# Patient Record
Sex: FEMALE | Race: WHITE | Employment: UNEMPLOYED | URBAN - METROPOLITAN AREA
[De-identification: names, ages, dates, MRNs, and addresses within clinical notes are randomized per-mention and may not be internally consistent; named-entity substitution may affect disease eponyms.]

---

## 2018-07-18 ENCOUNTER — HOSPITAL ENCOUNTER (EMERGENCY)
Age: 23
Discharge: HOME | End: 2018-07-18
Attending: EMERGENCY MEDICINE
Payer: COMMERCIAL

## 2018-07-18 VITALS
TEMPERATURE: 98 F | DIASTOLIC BLOOD PRESSURE: 74 MMHG | WEIGHT: 110 LBS | SYSTOLIC BLOOD PRESSURE: 108 MMHG | OXYGEN SATURATION: 98 % | HEIGHT: 61 IN | BODY MASS INDEX: 20.77 KG/M2 | RESPIRATION RATE: 16 BRPM | HEART RATE: 78 BPM

## 2018-07-18 DIAGNOSIS — R13.19 ESOPHAGEAL DYSPHAGIA: Primary | ICD-10-CM

## 2018-07-18 LAB — STREP GRP A PCR: NEGATIVE

## 2018-07-18 PROCEDURE — 87880 STREP A ASSAY W/OPTIC: CPT

## 2018-07-18 PROCEDURE — 99284 EMERGENCY DEPT VISIT MOD MDM: CPT

## 2018-07-18 NOTE — ED PROVIDER NOTES
HPI:  7/18/18,   Time: 10:37 AM         Silvina Renee is a 20 y.o. female presenting to the ED for difficulty swallowing and a foreign body sensation in throat after eating a hamburger, beginning 1d ago. The complaint has been persistent, moderate in severity, and worsened by swallowing. She states she has difficulties tolerating her saliva    ROS:   Pertinent positives and negatives are stated within HPI, all other systems reviewed and are negative.  --------------------------------------------- PAST HISTORY ---------------------------------------------  Past Medical History:  has a past medical history of Asthma and Drug abuse. Past Surgical History:  has a past surgical history that includes [place] tooth extraction. Social History:  reports that she has been smoking. She has been smoking about 0.50 packs per day. She has never used smokeless tobacco. She reports that she drinks alcohol. She reports that she uses drugs, including IV. Family History: family history is not on file. The patients home medications have been reviewed. Allergies: Patient has no known allergies. -------------------------------------------------- RESULTS -------------------------------------------------  All laboratory and radiology results have been personally reviewed by myself   LABS:  Results for orders placed or performed during the hospital encounter of 07/18/18   Strep Screen Group A Throat   Result Value Ref Range    Strep Grp A PCR Negative Negative       RADIOLOGY:  Interpreted by Radiologist.  No orders to display       ------------------------- NURSING NOTES AND VITALS REVIEWED ---------------------------   The nursing notes within the ED encounter and vital signs as below have been reviewed.    /74   Pulse 78   Temp 98 °F (36.7 °C) (Oral)   Resp 16   Ht 5' 1\" (1.549 m)   Wt 110 lb (49.9 kg)   LMP 07/01/2018   SpO2 98%   BMI 20.78 kg/m²   Oxygen Saturation Interpretation: Normal      ---------------------------------------------------PHYSICAL EXAM--------------------------------------      Constitutional/General: Alert and oriented x3, well appearing, non toxic in NAD  Head: NC/AT  Eyes: PERRL, EOMI  Mouth: Oropharynx clear, handling secretions, no trismus  Neck: Supple, full ROM, no meningeal signs  Pulmonary: Lungs clear to auscultation bilaterally, no wheezes, rales, or rhonchi. Not in respiratory distress  Cardiovascular:  Regular rate and rhythm, no murmurs, gallops, or rubs. 2+ distal pulses    Extremities: Moves all extremities x 4. Warm and well perfused  Skin: warm and dry without rash  Neurologic: GCS 15,  Psych: Normal Affect      ------------------------------ ED COURSE/MEDICAL DECISION MAKING----------------------  Medications - No data to display      Medical Decision Making:      The patient was able to tolerate a carbonated beverage in the ER after which she felt better. She was told to stay on a soft diet today and follow up with PCP or return if her symptoms worsened    Counseling: The emergency provider has spoken with the patient and spouse/SO and discussed todays results, in addition to providing specific details for the plan of care and counseling regarding the diagnosis and prognosis. Questions are answered at this time and they are agreeable with the plan.      --------------------------------- IMPRESSION AND DISPOSITION ---------------------------------    IMPRESSION  1.  Esophageal dysphagia        DISPOSITION  Disposition: Discharge to home  Patient condition is stable                  Giovanni Jean MD  07/18/18 6898

## 2021-07-09 ENCOUNTER — HOSPITAL ENCOUNTER (EMERGENCY)
Age: 26
Discharge: HOME | End: 2021-07-09
Payer: COMMERCIAL

## 2021-07-09 ENCOUNTER — HOSPITAL ENCOUNTER (EMERGENCY)
Age: 26
Discharge: LEFT BEFORE DISCHARGE | End: 2021-07-09
Payer: COMMERCIAL

## 2021-07-09 VITALS
RESPIRATION RATE: 16 BRPM | DIASTOLIC BLOOD PRESSURE: 82 MMHG | OXYGEN SATURATION: 98 % | SYSTOLIC BLOOD PRESSURE: 132 MMHG | TEMPERATURE: 98 F | HEART RATE: 88 BPM | WEIGHT: 106 LBS | HEIGHT: 61 IN | BODY MASS INDEX: 20.01 KG/M2

## 2021-07-09 VITALS — OXYGEN SATURATION: 95 % | TEMPERATURE: 98.5 F | HEART RATE: 85 BPM

## 2021-07-09 DIAGNOSIS — L23.7 POISON IVY DERMATITIS: Primary | ICD-10-CM

## 2021-07-09 PROCEDURE — 99283 EMERGENCY DEPT VISIT LOW MDM: CPT

## 2021-07-09 PROCEDURE — 6370000000 HC RX 637 (ALT 250 FOR IP): Performed by: PHYSICIAN ASSISTANT

## 2021-07-09 RX ORDER — PREDNISONE 20 MG/1
60 TABLET ORAL ONCE
Status: COMPLETED | OUTPATIENT
Start: 2021-07-09 | End: 2021-07-09

## 2021-07-09 RX ORDER — PREDNISONE 20 MG/1
TABLET ORAL
Qty: 18 TABLET | Refills: 0 | Status: SHIPPED | OUTPATIENT
Start: 2021-07-09 | End: 2021-07-19

## 2021-07-09 RX ADMIN — PREDNISONE 60 MG: 20 TABLET ORAL at 19:23

## 2021-07-09 NOTE — ED NOTES
Pt came up to nurse and stated that she needed to leave due to a family emergency and exited ED.       Fred Ramirez LPN  92/15/62 5968

## 2021-07-09 NOTE — ED PROVIDER NOTES
[facility]                                                                                                                                        Department of Emergency Medicine   ED  Provider Note  Admit Date/RoomTime: 7/9/2021  6:46 PM  ED Room: 02/02        HPI:  7/9/21,   Time: 7:02 PM EDT         Samra Oliveira is a 27 y.o. female presenting to the ED for body wide itchy rash, beginning few days ago. The complaint has been persistent, moderate in severity, and worsened by nothing. States she was in contact with poison ivy and now has itchy rash all over. Has been using some OTC meds without relief. Uncomfortable. No other complaints. Denies fever, chills or vomiting. No new medications or other new contacts. ROS:     Constitutional: Negative for fever and chills  HENT: Negative for ear pain, sore throat and sinus pressure  Eyes: Negative for pain, discharge and redness  Respiratory:  Negative for shortness of breath, cough and wheezing  Cardiovascular: Negative for CP, edema or palpitations  Gastrointestinal: Negative for nausea, vomiting, diarrhea and abdominal distention  Genitourinary: Negative for dysuria and frequency  Musculoskeletal: Negative for back pain and arthralgia  Skin: See HPi  Neurological: Negative for weakness and headaches  Hematological: Negative for adenopathy    All other systems reviewed and are negative      -------------------------------- PAST HISTORY ----------------------------------  Past Medical History:  has a past medical history of Asthma and Drug abuse. Past Surgical History:  has a past surgical history that includes [place] tooth extraction. Social History:  reports that she has been smoking. She has been smoking about 0.50 packs per day. She has never used smokeless tobacco. She reports current alcohol use. She reports current drug use. Drug: IV. Family History: family history is not on file.      The patients home medications have been reviewed. Allergies: Patient has no known allergies. --------------------------------- RESULTS ------------------------------------------  All laboratory and radiology results have been personally reviewed by myself   LABS:  No results found for this visit on 07/09/21. RADIOLOGY:  Interpreted by Radiologist.  No orders to display       ----------------- NURSING NOTES AND VITALS REVIEWED ---------------   The nursing notes within the ED encounter and vital signs as below have been reviewed. /82   Pulse 88   Temp 98 °F (36.7 °C) (Temporal)   Resp 16   Ht 5' 1\" (1.549 m)   Wt 106 lb (48.1 kg)   LMP 06/30/2021   SpO2 98%   BMI 20.03 kg/m²   Oxygen Saturation Interpretation: Normal      --------------------------------PHYSICAL EXAM------------------------------------      Constitutional/General: Alert and oriented x3, well appearing, non toxic in NAD  Head: NC/AT  Eyes: PERRL, EOMI  Mouth: Oropharynx clear, handling secretions, no trismus  Neck: Supple, full ROM, no meningeal signs  Pulmonary: Lungs clear to auscultation bilaterally, no wheezes, rales, or rhonchi. Not in respiratory distress  Cardiovascular:  Regular rate and rhythm, no murmurs, gallops, or rubs. 2+ distal pulses  Extremities: Moves all extremities x 4. Warm and well perfused  Skin:  Pt has diffuse inflammatory rash consistent with contact dermatitis  Neurologic: GCS 15,  Intact. No focal deficits  Psych: Normal Affect      ------------------------ ED COURSE/MEDICAL DECISION MAKING----------------------  Medications   predniSONE (DELTASONE) tablet 60 mg (has no administration in time range)         Medical Decision Making:    Contact dermatitis. Plan Prednisone here and for home. Topical OTC agents as needed if persistent complaints. Pt aware and agreeable to plan       Counseling:    The emergency provider has spoken with the patient and discussed todays results, in addition to providing specific details for the plan of care and counseling regarding the diagnosis and prognosis. Questions are answered at this time and they are agreeable with the plan.      ------------------------ IMPRESSION AND DISPOSITION -------------------------------    IMPRESSION  1.  Poison ivy dermatitis        DISPOSITION  Disposition: Discharge to home  Patient condition is stable                   Annabelle Flores PA-C  07/09/21 5708

## 2021-09-22 VITALS
TEMPERATURE: 97 F | SYSTOLIC BLOOD PRESSURE: 123 MMHG | HEIGHT: 61 IN | HEART RATE: 89 BPM | DIASTOLIC BLOOD PRESSURE: 79 MMHG | WEIGHT: 105 LBS | RESPIRATION RATE: 16 BRPM | BODY MASS INDEX: 19.83 KG/M2 | OXYGEN SATURATION: 97 %

## 2021-09-22 ASSESSMENT — PAIN SCALES - GENERAL: PAINLEVEL_OUTOF10: 6

## 2021-09-23 ENCOUNTER — HOSPITAL ENCOUNTER (EMERGENCY)
Age: 26
Discharge: HOME | End: 2021-09-23
Payer: COMMERCIAL

## 2021-11-01 ENCOUNTER — HOSPITAL ENCOUNTER (EMERGENCY)
Age: 26
Discharge: HOME | End: 2021-11-01
Payer: COMMERCIAL

## 2021-11-01 VITALS
WEIGHT: 104 LBS | HEIGHT: 61 IN | SYSTOLIC BLOOD PRESSURE: 121 MMHG | TEMPERATURE: 97.8 F | RESPIRATION RATE: 16 BRPM | BODY MASS INDEX: 19.63 KG/M2 | OXYGEN SATURATION: 98 % | HEART RATE: 70 BPM | DIASTOLIC BLOOD PRESSURE: 88 MMHG

## 2021-11-01 DIAGNOSIS — N89.8 VAGINAL DISCHARGE: Primary | ICD-10-CM

## 2021-11-01 LAB
BACTERIA: ABNORMAL /HPF
BILIRUBIN URINE: NEGATIVE
BLOOD, URINE: NEGATIVE
CLARITY: ABNORMAL
CLUE CELLS: NORMAL
COLOR: YELLOW
EPITHELIAL CELLS, UA: ABNORMAL /HPF
GLUCOSE URINE: 100 MG/DL
HCG, URINE, POC: NEGATIVE
KETONES, URINE: 15 MG/DL
LEUKOCYTE ESTERASE, URINE: NEGATIVE
Lab: NORMAL
NEGATIVE QC PASS/FAIL: NORMAL
NITRITE, URINE: NEGATIVE
PH UA: 6 (ref 5–9)
POSITIVE QC PASS/FAIL: NORMAL
PROTEIN UA: NEGATIVE MG/DL
RBC UA: ABNORMAL /HPF (ref 0–2)
SOURCE WET PREP: NORMAL
SPECIFIC GRAVITY UA: 1.02 (ref 1–1.03)
TRICHOMONAS PREP: NORMAL
UROBILINOGEN, URINE: 4 E.U./DL
WBC UA: ABNORMAL /HPF (ref 0–5)
YEAST WET PREP: NORMAL

## 2021-11-01 PROCEDURE — 87210 SMEAR WET MOUNT SALINE/INK: CPT

## 2021-11-01 PROCEDURE — 99283 EMERGENCY DEPT VISIT LOW MDM: CPT

## 2021-11-01 PROCEDURE — 87491 CHLMYD TRACH DNA AMP PROBE: CPT

## 2021-11-01 PROCEDURE — 87591 N.GONORRHOEAE DNA AMP PROB: CPT

## 2021-11-01 PROCEDURE — 81001 URINALYSIS AUTO W/SCOPE: CPT

## 2021-11-01 PROCEDURE — 96372 THER/PROPH/DIAG INJ SC/IM: CPT

## 2021-11-01 PROCEDURE — 6370000000 HC RX 637 (ALT 250 FOR IP): Performed by: NURSE PRACTITIONER

## 2021-11-01 PROCEDURE — 6360000002 HC RX W HCPCS: Performed by: NURSE PRACTITIONER

## 2021-11-01 RX ORDER — CEFTRIAXONE 1 G/1
500 INJECTION, POWDER, FOR SOLUTION INTRAMUSCULAR; INTRAVENOUS ONCE
Status: COMPLETED | OUTPATIENT
Start: 2021-11-01 | End: 2021-11-01

## 2021-11-01 RX ORDER — ONDANSETRON 4 MG/1
4 TABLET, ORALLY DISINTEGRATING ORAL ONCE
Status: COMPLETED | OUTPATIENT
Start: 2021-11-01 | End: 2021-11-01

## 2021-11-01 RX ORDER — METHADONE HYDROCHLORIDE 10 MG/ML
100 CONCENTRATE ORAL DAILY
COMMUNITY

## 2021-11-01 RX ORDER — AZITHROMYCIN 250 MG/1
1000 TABLET, FILM COATED ORAL ONCE
Status: COMPLETED | OUTPATIENT
Start: 2021-11-01 | End: 2021-11-01

## 2021-11-01 RX ADMIN — ONDANSETRON 4 MG: 4 TABLET, ORALLY DISINTEGRATING ORAL at 14:28

## 2021-11-01 RX ADMIN — CEFTRIAXONE 500 MG: 1 INJECTION, POWDER, FOR SOLUTION INTRAMUSCULAR; INTRAVENOUS at 14:28

## 2021-11-01 RX ADMIN — AZITHROMYCIN 1000 MG: 250 TABLET, FILM COATED ORAL at 14:28

## 2021-11-01 NOTE — ED PROVIDER NOTES
[address]  Department of Emergency Medicine   ED  Encounter Note  Admit Date/RoomTime: 2021 11:59 AM  ED Room: CHAIR01/    NAME: Catie Lester  : 1995  MRN: [id number]     Chief Complaint:  Vaginal Discharge (patient states vaginal discharge and odor and wants antibiotics)    History of Present Illness       Catie Lester is a 27 y.o. old female who presents to the emergency department by private vehicle for vaginal discharge: malodorous, which occured a few day(s) prior to arrival.  Since onset the symptoms have been stable and mild in severity. Symptoms are associated with no additional symptoms and denies any abdominal pain, fever, chills or vaginal bleeding. She takes no blood thinning agents. Patient's last menstrual period was 10/25/2021. No obstetric history on file. Does not have an OB/GYN and denies concern for STI. ROS   Pertinent positives and negatives are stated within HPI, all other systems reviewed and are negative. Past Medical History:  has a past medical history of Asthma and Drug abuse ([facility] [address] Surgical History:  has a past surgical history that includes [place] tooth extraction. Social History:  reports that she has been smoking. She has been smoking about 0.50 packs per day. She has never used smokeless tobacco. She reports current alcohol use. She reports current drug use. Drug: IV. Family History: family history is not on file. Allergies: Patient has no known allergies. Physical Exam   Oxygen Saturation Interpretation: Normal.        ED Triage Vitals [21 1052]   BP Temp Temp src Pulse Resp SpO2 Height Weight   117/82 97.8 °F (36.6 °C) -- 76 16 99 % 5' 1\" (1.549 m) 104 lb (47.2 kg)         General Appearance/Constitutional:  Alert, development consistent with age, NAD. HEENT:  NC/NT. PERRLA. Airway patent. Neck:  Supple. No lymphadenopathy.   Respiratory:  Clear to auscultation and breath sounds equal.  CV:  Regular rate and rhythm. GI:  normal appearing, non-distended with no visible hernias. Bowel sounds: normal bowel sounds. Tenderness: No abdominal tenderness, guarding, rebound, rigidity or pulsatile mass. .        Liver: non-tender. Spleen:  non-tender. Back: CVA Tenderness: No CVA tenderness. : Pelvic Exam: (Same sex / third party chaperone present during examination). External Genitalia: General appearance; normal, Hair distribution; normal, Lesions absent. Urethral Meatus: Size normal, Location normal, Lesions absent, Prolapse absent. Vagina: discharge. Cervix: cervical motion tenderness absent and friable. Uterus:  not examined. Adenexa: no tenderness bilaterally. Anus/Perineum:  normal.  Integument:  Normal turgor. Warm, dry, without visible rash, unless noted elsewhere. Lymphatics: No lymphangitis or adenopathy noted. Neurological:  Orientation age-appropriate. Motor functions intact.     Lab / Imaging Results   (All laboratory and radiology results have been personally reviewed by myself)  Labs:  Results for orders placed or performed during the hospital encounter of 11/01/21   Wet prep, genital    Specimen: Vaginal   Result Value Ref Range    Trichomonas Prep None Seen     Yeast, Wet Prep None Seen     Clue Cells, Wet Prep None Seen     Source Wet Prep VAGINAL    C.trachomatis N.gonorrhoeae DNA    Specimen: Cervix   Result Value Ref Range    Source Vagina    Urinalysis with Microscopic   Result Value Ref Range    Color, UA Yellow Straw/Yellow    Clarity, UA SL CLOUDY Clear    Glucose, Ur 100 (A) Negative mg/dL    Bilirubin Urine Negative Negative    Ketones, Urine 15 (A) Negative mg/dL    Specific Gravity, UA 1.025 1.005 - 1.030    Blood, Urine Negative Negative    pH, UA 6.0 5.0 - 9.0    Protein, UA Negative Negative mg/dL    Urobilinogen, Urine 4.0 (A) <2.0 E.U./dL    Nitrite, Urine Negative Negative    Leukocyte Esterase, Urine Negative Negative    WBC, UA NONE 0 - 5 /HPF    RBC, UA NONE 0 - 2 /HPF    Epithelial Cells, UA MODERATE /HPF    Bacteria, UA RARE (A) None Seen /HPF   POC Pregnancy Urine Qual   Result Value Ref Range    HCG, Urine, POC Negative Negative    Lot Number [id number]     Positive QC Pass/Fail Pass     Negative QC Pass/Fail Pass      Imaging: All Radiology results interpreted by Radiologist unless otherwise noted. No orders to display     ED Course / Medical Decision Making     Medications   cefTRIAXone (ROCEPHIN) injection 500 mg (has no administration in time range)   azithromycin (ZITHROMAX) tablet 1,000 mg (has no administration in time range)   ondansetron (ZOFRAN-ODT) disintegrating tablet 4 mg (has no administration in time range)          Consults:   None    Procedures:   none    MDM:   Patient is well-appearing, afebrile. Presents with vaginal discharge malodorous for several days. Denies concern for STI exposure. No abdominal pain fever or chills. UA trace ketones otherwise reassuring. POC pregnancy negative vaginal exam complete friable cervix with discharge noted on exam.  Wet prep obtained and negative. GC and Chlamydia cultures obtained and pending. Patient ordered STI prophylaxis, educated on safe sex practices and outpatient follow-up as needed. Plan of Care/Counseling:  CLAUDIA Kathleen CNP reviewed today's visit with the patient in addition to providing specific details for the plan of care and counseling regarding the diagnosis and prognosis. Questions are answered at this time and are agreeable with the plan. Assessment      1. Vaginal discharge      Plan   Discharged home. Patient condition is good    New Medications     New Prescriptions    No medications on file     Electronically signed by CLAUDIA Kathleen CNP   DD: 11/1/21  **This report was transcribed using voice recognition software.  Every effort was made to ensure accuracy; however, inadvertent computerized transcription errors may be present.   END OF ED PROVIDER NOTE        Geetha Johnston, CLAUDIA - EDITH  11/01/21 6974

## 2021-11-01 NOTE — ED NOTES
Patient was discharged but had not received her medication. She is unable to be found, patient eloped.       Nathalia Mcadams RN  11/01/21 1550

## 2021-11-04 LAB
C TRACH DNA GENITAL QL NAA+PROBE: NEGATIVE
N. GONORRHOEAE DNA: NEGATIVE
SOURCE: NORMAL

## 2022-02-11 ENCOUNTER — APPOINTMENT (OUTPATIENT)
Dept: GENERAL RADIOLOGY | Age: 27
DRG: 347 | End: 2022-02-11
Payer: COMMERCIAL

## 2022-02-11 ENCOUNTER — APPOINTMENT (OUTPATIENT)
Dept: CT IMAGING | Age: 27
DRG: 347 | End: 2022-02-11
Payer: COMMERCIAL

## 2022-02-11 ENCOUNTER — HOSPITAL ENCOUNTER (INPATIENT)
Age: 27
LOS: 3 days | Discharge: HOME HEALTH | DRG: 347 | End: 2022-02-14
Attending: STUDENT IN AN ORGANIZED HEALTH CARE EDUCATION/TRAINING PROGRAM | Admitting: SURGERY
Payer: COMMERCIAL

## 2022-02-11 DIAGNOSIS — S82.51XA CLOSED DISPLACED FRACTURE OF MEDIAL MALLEOLUS OF RIGHT TIBIA, INITIAL ENCOUNTER: ICD-10-CM

## 2022-02-11 DIAGNOSIS — S70.01XA CONTUSION OF RIGHT HIP, INITIAL ENCOUNTER: ICD-10-CM

## 2022-02-11 DIAGNOSIS — S82.891A CLOSED FRACTURE OF RIGHT ANKLE, INITIAL ENCOUNTER: ICD-10-CM

## 2022-02-11 DIAGNOSIS — S32.020A COMPRESSION FRACTURE OF L2 LUMBAR VERTEBRA, CLOSED, INITIAL ENCOUNTER (HCC): Primary | ICD-10-CM

## 2022-02-11 PROCEDURE — 99223 1ST HOSP IP/OBS HIGH 75: CPT | Performed by: SURGERY

## 2022-02-11 PROCEDURE — 73610 X-RAY EXAM OF ANKLE: CPT

## 2022-02-11 PROCEDURE — 1200000000 HC SEMI PRIVATE

## 2022-02-11 PROCEDURE — 72100 X-RAY EXAM L-S SPINE 2/3 VWS: CPT

## 2022-02-11 PROCEDURE — 99285 EMERGENCY DEPT VISIT HI MDM: CPT

## 2022-02-11 PROCEDURE — 72125 CT NECK SPINE W/O DYE: CPT

## 2022-02-11 PROCEDURE — 2580000003 HC RX 258: Performed by: STUDENT IN AN ORGANIZED HEALTH CARE EDUCATION/TRAINING PROGRAM

## 2022-02-11 PROCEDURE — 70450 CT HEAD/BRAIN W/O DYE: CPT

## 2022-02-11 PROCEDURE — 2W3QX1Z IMMOBILIZATION OF RIGHT LOWER LEG USING SPLINT: ICD-10-PCS | Performed by: STUDENT IN AN ORGANIZED HEALTH CARE EDUCATION/TRAINING PROGRAM

## 2022-02-11 PROCEDURE — 6360000002 HC RX W HCPCS

## 2022-02-11 PROCEDURE — 96376 TX/PRO/DX INJ SAME DRUG ADON: CPT

## 2022-02-11 PROCEDURE — 99222 1ST HOSP IP/OBS MODERATE 55: CPT | Performed by: NEUROLOGICAL SURGERY

## 2022-02-11 PROCEDURE — 72131 CT LUMBAR SPINE W/O DYE: CPT

## 2022-02-11 PROCEDURE — 6360000002 HC RX W HCPCS: Performed by: STUDENT IN AN ORGANIZED HEALTH CARE EDUCATION/TRAINING PROGRAM

## 2022-02-11 PROCEDURE — 6370000000 HC RX 637 (ALT 250 FOR IP): Performed by: STUDENT IN AN ORGANIZED HEALTH CARE EDUCATION/TRAINING PROGRAM

## 2022-02-11 PROCEDURE — 96374 THER/PROPH/DIAG INJ IV PUSH: CPT

## 2022-02-11 PROCEDURE — 73502 X-RAY EXAM HIP UNI 2-3 VIEWS: CPT

## 2022-02-11 PROCEDURE — 73630 X-RAY EXAM OF FOOT: CPT

## 2022-02-11 RX ORDER — OXYCODONE HYDROCHLORIDE AND ACETAMINOPHEN 5; 325 MG/1; MG/1
2 TABLET ORAL ONCE
Status: COMPLETED | OUTPATIENT
Start: 2022-02-11 | End: 2022-02-11

## 2022-02-11 RX ORDER — OXYCODONE HYDROCHLORIDE 10 MG/1
10 TABLET ORAL EVERY 4 HOURS PRN
Status: DISCONTINUED | OUTPATIENT
Start: 2022-02-11 | End: 2022-02-14 | Stop reason: HOSPADM

## 2022-02-11 RX ORDER — POLYETHYLENE GLYCOL 3350 17 G/17G
17 POWDER, FOR SOLUTION ORAL DAILY
Status: DISCONTINUED | OUTPATIENT
Start: 2022-02-12 | End: 2022-02-14 | Stop reason: HOSPADM

## 2022-02-11 RX ORDER — METHOCARBAMOL 500 MG/1
1000 TABLET, FILM COATED ORAL 4 TIMES DAILY
Status: DISCONTINUED | OUTPATIENT
Start: 2022-02-11 | End: 2022-02-14 | Stop reason: HOSPADM

## 2022-02-11 RX ORDER — FENTANYL CITRATE 50 UG/ML
INJECTION, SOLUTION INTRAMUSCULAR; INTRAVENOUS
Status: COMPLETED
Start: 2022-02-11 | End: 2022-02-11

## 2022-02-11 RX ORDER — FENTANYL CITRATE 50 UG/ML
50 INJECTION, SOLUTION INTRAMUSCULAR; INTRAVENOUS ONCE
Status: COMPLETED | OUTPATIENT
Start: 2022-02-11 | End: 2022-02-11

## 2022-02-11 RX ORDER — FENTANYL CITRATE 50 UG/ML
25 INJECTION, SOLUTION INTRAMUSCULAR; INTRAVENOUS ONCE
Status: COMPLETED | OUTPATIENT
Start: 2022-02-11 | End: 2022-02-11

## 2022-02-11 RX ORDER — LIDOCAINE HYDROCHLORIDE 10 MG/ML
20 INJECTION, SOLUTION INFILTRATION; PERINEURAL ONCE
Status: DISCONTINUED | OUTPATIENT
Start: 2022-02-11 | End: 2022-02-14 | Stop reason: HOSPADM

## 2022-02-11 RX ORDER — ONDANSETRON 2 MG/ML
4 INJECTION INTRAMUSCULAR; INTRAVENOUS EVERY 6 HOURS PRN
Status: DISCONTINUED | OUTPATIENT
Start: 2022-02-11 | End: 2022-02-14 | Stop reason: HOSPADM

## 2022-02-11 RX ORDER — SODIUM CHLORIDE 0.9 % (FLUSH) 0.9 %
5-40 SYRINGE (ML) INJECTION EVERY 12 HOURS SCHEDULED
Status: DISCONTINUED | OUTPATIENT
Start: 2022-02-11 | End: 2022-02-14 | Stop reason: HOSPADM

## 2022-02-11 RX ORDER — SODIUM CHLORIDE 0.9 % (FLUSH) 0.9 %
5-40 SYRINGE (ML) INJECTION PRN
Status: DISCONTINUED | OUTPATIENT
Start: 2022-02-11 | End: 2022-02-14 | Stop reason: HOSPADM

## 2022-02-11 RX ORDER — SODIUM CHLORIDE 9 MG/ML
25 INJECTION, SOLUTION INTRAVENOUS PRN
Status: DISCONTINUED | OUTPATIENT
Start: 2022-02-11 | End: 2022-02-14 | Stop reason: HOSPADM

## 2022-02-11 RX ORDER — LIDOCAINE HYDROCHLORIDE 10 MG/ML
INJECTION, SOLUTION INFILTRATION; PERINEURAL
Status: DISPENSED
Start: 2022-02-11 | End: 2022-02-12

## 2022-02-11 RX ORDER — ACETAMINOPHEN 325 MG/1
650 TABLET ORAL EVERY 6 HOURS
Status: DISCONTINUED | OUTPATIENT
Start: 2022-02-12 | End: 2022-02-14 | Stop reason: HOSPADM

## 2022-02-11 RX ORDER — GABAPENTIN 100 MG/1
100 CAPSULE ORAL EVERY 8 HOURS SCHEDULED
Status: DISCONTINUED | OUTPATIENT
Start: 2022-02-11 | End: 2022-02-14 | Stop reason: HOSPADM

## 2022-02-11 RX ORDER — OXYCODONE HYDROCHLORIDE 5 MG/1
5 TABLET ORAL EVERY 4 HOURS PRN
Status: DISCONTINUED | OUTPATIENT
Start: 2022-02-11 | End: 2022-02-14 | Stop reason: HOSPADM

## 2022-02-11 RX ORDER — ONDANSETRON 4 MG/1
4 TABLET, ORALLY DISINTEGRATING ORAL EVERY 8 HOURS PRN
Status: DISCONTINUED | OUTPATIENT
Start: 2022-02-11 | End: 2022-02-14 | Stop reason: HOSPADM

## 2022-02-11 RX ADMIN — FENTANYL CITRATE 50 MCG: 50 INJECTION, SOLUTION INTRAMUSCULAR; INTRAVENOUS at 21:09

## 2022-02-11 RX ADMIN — FENTANYL CITRATE 50 MCG: 0.05 INJECTION, SOLUTION INTRAMUSCULAR; INTRAVENOUS at 21:09

## 2022-02-11 RX ADMIN — FENTANYL CITRATE 25 MCG: 50 INJECTION, SOLUTION INTRAMUSCULAR; INTRAVENOUS at 15:12

## 2022-02-11 RX ADMIN — FENTANYL CITRATE 25 MCG: 50 INJECTION, SOLUTION INTRAMUSCULAR; INTRAVENOUS at 23:04

## 2022-02-11 RX ADMIN — OXYCODONE AND ACETAMINOPHEN 2 TABLET: 5; 325 TABLET ORAL at 18:07

## 2022-02-11 RX ADMIN — FENTANYL CITRATE 25 MCG: 50 INJECTION, SOLUTION INTRAMUSCULAR; INTRAVENOUS at 13:01

## 2022-02-11 RX ADMIN — Medication 10 ML: at 23:04

## 2022-02-11 ASSESSMENT — PAIN SCALES - GENERAL
PAINLEVEL_OUTOF10: 10
PAINLEVEL_OUTOF10: 8
PAINLEVEL_OUTOF10: 9

## 2022-02-11 ASSESSMENT — PAIN DESCRIPTION - DESCRIPTORS: DESCRIPTORS: ACHING;JABBING;NAGGING

## 2022-02-11 ASSESSMENT — PAIN DESCRIPTION - ORIENTATION: ORIENTATION: RIGHT

## 2022-02-11 ASSESSMENT — PAIN DESCRIPTION - LOCATION: LOCATION: ANKLE;BACK;HIP

## 2022-02-11 ASSESSMENT — PAIN DESCRIPTION - PAIN TYPE: TYPE: ACUTE PAIN

## 2022-02-11 NOTE — ED PROVIDER NOTES
ED  Provider Note  Admit Date/RoomTime: 2/11/2022 12:27 PM  ED Room: [address] [place] [address]      History of Present Illness:  2/11/22, Time: 12:31 PM EST  Chief Complaint   Patient presents with    Fall     pt states she fell from 2nd story window while cleaning today. -LOC, landed on ankle and bottom. c/o right sided pain to hip, back, and ankle. denies CP, SOB, headache       Merari Gonzales is a 27 y.o. female presenting to the ED for right ankle and right hip pain after mechanical fall. Patient was cleaning her window (first floor), and was kneeling on the windowsill on the inside of the window when she fell forward, onto her right hip and ankle, denies any head strike or loss consciousness. No focal weakness or numbness. This event occurred 2 days ago, but patient states she was unable to come to the emergency department as she was unable to walk. Denies head strike or loss consciousness. No thinners. Denies chest pain, shortness of breath, abdominal pain, nausea or vomiting. No weakness or numbness of lower extremities, no saddle anaesthesia, no bowel or bladder incontinence, no urinary retention. Onset: Sudden  Timing: Persistent  Duration: Days  Associated symptoms: As above, no fevers or chills, no cough/congestion/rhinorrhea. Severity: Severe  Exacerbated by: Movement  Relieved by: Minimal relief with home Tylenol. Review of Systems:   A complete review of systems was performed and pertinent positives and negatives are stated within HPI, all other systems reviewed and are negative.        --------------------------------------------- PATIENT HISTORY--------------------------------------------  Past Medical History:  has a past medical history of Asthma and Drug abuse ([facility] [address] Past Surgical History:  has a past surgical history that includes [place] tooth extraction. Family History: family history is not on file. . Unless otherwise noted, family history is non contributory    Social History:  reports that she has been smoking cigarettes. She has been smoking about 1.00 pack per day. She has never used smokeless tobacco. She reports current alcohol use. She reports current drug use. Drug: IV. The patients home medications have been reviewed. Allergies: Patient has no known allergies. I have reviewed the past medical history, past surgical history, social history, and family history    ---------------------------------------------------PHYSICAL EXAM--------------------------------------    Constitutional/General: Alert and oriented x3  Head: Normocephalic and atraumatic  Eyes: PERRL, EOMI, sclera non icteric  ENT: Oropharynx clear, handling secretions, no trismus  Neck: Supple, full ROM, no stridor, no meningismus  Respiratory: Lungs clear to auscultation bilaterally  Cardiovascular: RRR, no R/G/M, 2+ peripheral pulses  Chest: No chest wall tenderness, equal chest rise  Gastrointestinal: Soft nontender nondistended  Musculoskeletal: Extremities warm and well perfused, moving all extremities, mild edema to R ankle TTP, ecchymosis of ankle diffusely, pain limited ROM at R hip, knee d/t ankle pain, no midline spinal TTP, no stepoffs or deformities  Skin: skin warm and dry. No rashes. Neurologic: No focal deficits, strength and sensation grossly intact   Psychiatric: Normal Affect, behavior normal      -------------------------------------------------- RESULTS -------------------------------------------------  I have personally reviewed all laboratory and imaging results for this patient. Results are listed below. LABS: (Lab results interpreted by me)  No results found for this visit on 02/11/22. RADIOLOGY:  Imaging interpreted by Radiologist unless otherwise specified  CT LUMBAR SPINE WO CONTRAST   Final Result   Moderate superior endplate compression fracture L2 vertebral body with 3 mm   of retropulsion. XR HIP 2-3 VW W PELVIS RIGHT   Final Result   No acute abnormality of the hip. PROCEDURE NOTE  2/11/22         SPLINT  APPLICATION  Risks, benefits and alternatives (for applicable procedures below) described. Performed By: Rhonda Aguilar MD and EM Resident. Indication:  fracture of ankle . Procedure:   A right leg  stirrup splint was applied by me. The patient tolerated the procedure well. Trauma consult, pain control. CSP and CT head ordered and pending. ED Counseling: This emergency provider has spoken with the patient and any family present to discuss clinical status, results, plan of care, diagnosis and prognosis as able to be determined at this time. Any questions were answered and patient and/or family/POA are agreeable with the plan.       --------------------------------- IMPRESSION AND DISPOSITION ---------------------------------    IMPRESSION  1. Compression fracture of L2 lumbar vertebra, closed, initial encounter ([facility] [address]    2. Closed fracture of right ankle, initial encounter        DISPOSITION  Disposition: Admit to med/surg floor  Patient condition is stable      This report was transcribed using voice recognition software. Every effort was made to ensure accuracy; however, transcription errors may be present.          Rhonda Aguilar MD  02/11/22 2008

## 2022-02-11 NOTE — ED NOTES
Patient arrives via EMS from a soup kitchen. Patient states yesterday she was cleanig windows when she fell 2 stories. Patient c/o right ankle and lower back pain. Patient has full sensation in right foot and ankle and + pulses. Pt undressed in hospital gown, bed in low position, side rails up. MD at bedside.      [facility]  02/11/22 [address] [facility], RN  02/11/22 1230

## 2022-02-11 NOTE — ED NOTES
Radiology Procedure Waiver   Name: Thais Santiago  : 1995  MRN: [id number]    Date:  22    Time: 4:26 PM EST    Benefits of immediately proceeding with Radiology exam(s) without pre-testing outweigh the risks or are not indicated as specified below and therefore the following is/are being waived:    [] Pregnancy test   [x] Patients LMP on-time and regular.   [] Patient had Tubal Ligation or has other Contraception Device. [] Patient  is Menopausal or Premenarcheal.    [] Patient had Full or Partial Hysterectomy. [] Protocol for Iodine allergy    [] MRI Questionnaire     [] BUN/Creatinine   [] Patient age w/no hx of renal dysfunction. [] Patient on Dialysis. [] Recent Normal Labs.   Electronically signed by Rodney Braden MD on 22 at 4:26 PM CARLOS Braden MD  22 [phone]

## 2022-02-12 PROBLEM — F11.20 METHADONE MAINTENANCE THERAPY PATIENT (HCC): Status: ACTIVE | Noted: 2022-02-12

## 2022-02-12 PROBLEM — F14.10 COCAINE ABUSE (HCC): Status: ACTIVE | Noted: 2022-02-12

## 2022-02-12 PROBLEM — S82.51XA CLOSED DISPLACED FRACTURE OF MEDIAL MALLEOLUS OF RIGHT TIBIA: Status: ACTIVE | Noted: 2022-02-12

## 2022-02-12 LAB
AMPHETAMINE SCREEN, URINE: NOT DETECTED
ANION GAP SERPL CALCULATED.3IONS-SCNC: 10 MMOL/L (ref 7–16)
BARBITURATE SCREEN URINE: NOT DETECTED
BASOPHILS ABSOLUTE: 0.05 E9/L (ref 0–0.2)
BASOPHILS RELATIVE PERCENT: 0.5 % (ref 0–2)
BENZODIAZEPINE SCREEN, URINE: NOT DETECTED
BUN BLDV-MCNC: 12 MG/DL (ref 6–20)
CALCIUM SERPL-MCNC: 9.4 MG/DL (ref 8.6–10.2)
CANNABINOID SCREEN URINE: NOT DETECTED
CHLORIDE BLD-SCNC: 101 MMOL/L (ref 98–107)
CO2: 24 MMOL/L (ref 22–29)
COCAINE METABOLITE SCREEN URINE: POSITIVE
CREAT SERPL-MCNC: 0.7 MG/DL (ref 0.5–1)
EOSINOPHILS ABSOLUTE: 0.06 E9/L (ref 0.05–0.5)
EOSINOPHILS RELATIVE PERCENT: 0.6 % (ref 0–6)
FENTANYL SCREEN, URINE: POSITIVE
GFR AFRICAN AMERICAN: >60
GFR NON-AFRICAN AMERICAN: >60 ML/MIN/1.73
GLUCOSE BLD-MCNC: 109 MG/DL (ref 74–99)
HCG(URINE) PREGNANCY TEST: NEGATIVE
HCT VFR BLD CALC: 38.8 % (ref 34–48)
HEMOGLOBIN: 12.8 G/DL (ref 11.5–15.5)
IMMATURE GRANULOCYTES #: 0.05 E9/L
IMMATURE GRANULOCYTES %: 0.5 % (ref 0–5)
LYMPHOCYTES ABSOLUTE: 1.55 E9/L (ref 1.5–4)
LYMPHOCYTES RELATIVE PERCENT: 14.8 % (ref 20–42)
Lab: ABNORMAL
MCH RBC QN AUTO: 30.7 PG (ref 26–35)
MCHC RBC AUTO-ENTMCNC: 33 % (ref 32–34.5)
MCV RBC AUTO: 93 FL (ref 80–99.9)
METHADONE SCREEN, URINE: POSITIVE
MONOCYTES ABSOLUTE: 1 E9/L (ref 0.1–0.95)
MONOCYTES RELATIVE PERCENT: 9.6 % (ref 2–12)
NEUTROPHILS ABSOLUTE: 7.73 E9/L (ref 1.8–7.3)
NEUTROPHILS RELATIVE PERCENT: 74 % (ref 43–80)
OPIATE SCREEN URINE: NOT DETECTED
OXYCODONE URINE: POSITIVE
PDW BLD-RTO: 11.2 FL (ref 11.5–15)
PHENCYCLIDINE SCREEN URINE: NOT DETECTED
PLATELET # BLD: 313 E9/L (ref 130–450)
PMV BLD AUTO: 10.1 FL (ref 7–12)
POTASSIUM REFLEX MAGNESIUM: 4.7 MMOL/L (ref 3.5–5)
RBC # BLD: 4.17 E12/L (ref 3.5–5.5)
SODIUM BLD-SCNC: 135 MMOL/L (ref 132–146)
WBC # BLD: 10.4 E9/L (ref 4.5–11.5)

## 2022-02-12 PROCEDURE — 36415 COLL VENOUS BLD VENIPUNCTURE: CPT

## 2022-02-12 PROCEDURE — 85025 COMPLETE CBC W/AUTO DIFF WBC: CPT

## 2022-02-12 PROCEDURE — 1200000000 HC SEMI PRIVATE

## 2022-02-12 PROCEDURE — 6370000000 HC RX 637 (ALT 250 FOR IP): Performed by: STUDENT IN AN ORGANIZED HEALTH CARE EDUCATION/TRAINING PROGRAM

## 2022-02-12 PROCEDURE — 6360000002 HC RX W HCPCS: Performed by: STUDENT IN AN ORGANIZED HEALTH CARE EDUCATION/TRAINING PROGRAM

## 2022-02-12 PROCEDURE — 80307 DRUG TEST PRSMV CHEM ANLYZR: CPT

## 2022-02-12 PROCEDURE — 99232 SBSQ HOSP IP/OBS MODERATE 35: CPT | Performed by: NEUROLOGICAL SURGERY

## 2022-02-12 PROCEDURE — 2580000003 HC RX 258: Performed by: STUDENT IN AN ORGANIZED HEALTH CARE EDUCATION/TRAINING PROGRAM

## 2022-02-12 PROCEDURE — 80048 BASIC METABOLIC PNL TOTAL CA: CPT

## 2022-02-12 PROCEDURE — 81025 URINE PREGNANCY TEST: CPT

## 2022-02-12 RX ORDER — METHADONE HYDROCHLORIDE 10 MG/ML
100 CONCENTRATE ORAL DAILY
Status: DISCONTINUED | OUTPATIENT
Start: 2022-02-12 | End: 2022-02-14 | Stop reason: HOSPADM

## 2022-02-12 RX ADMIN — METHOCARBAMOL 1000 MG: 500 TABLET ORAL at 17:29

## 2022-02-12 RX ADMIN — GABAPENTIN 100 MG: 100 CAPSULE ORAL at 20:51

## 2022-02-12 RX ADMIN — Medication 5 ML: at 20:52

## 2022-02-12 RX ADMIN — POLYETHYLENE GLYCOL 3350 17 G: 17 POWDER, FOR SOLUTION ORAL at 08:23

## 2022-02-12 RX ADMIN — OXYCODONE HYDROCHLORIDE 10 MG: 10 TABLET ORAL at 01:57

## 2022-02-12 RX ADMIN — GABAPENTIN 100 MG: 100 CAPSULE ORAL at 01:36

## 2022-02-12 RX ADMIN — METHOCARBAMOL 1000 MG: 500 TABLET ORAL at 20:51

## 2022-02-12 RX ADMIN — ENOXAPARIN SODIUM 30 MG: 100 INJECTION SUBCUTANEOUS at 08:23

## 2022-02-12 RX ADMIN — Medication 10 ML: at 08:27

## 2022-02-12 RX ADMIN — METHOCARBAMOL 1000 MG: 500 TABLET ORAL at 12:34

## 2022-02-12 RX ADMIN — ACETAMINOPHEN 650 MG: 325 TABLET ORAL at 12:34

## 2022-02-12 RX ADMIN — ACETAMINOPHEN 650 MG: 325 TABLET ORAL at 18:25

## 2022-02-12 RX ADMIN — METHOCARBAMOL 1000 MG: 500 TABLET ORAL at 01:36

## 2022-02-12 RX ADMIN — OXYCODONE HYDROCHLORIDE 10 MG: 10 TABLET ORAL at 21:48

## 2022-02-12 RX ADMIN — OXYCODONE HYDROCHLORIDE 10 MG: 10 TABLET ORAL at 17:29

## 2022-02-12 RX ADMIN — OXYCODONE HYDROCHLORIDE 10 MG: 10 TABLET ORAL at 12:57

## 2022-02-12 RX ADMIN — ACETAMINOPHEN 650 MG: 325 TABLET ORAL at 01:36

## 2022-02-12 RX ADMIN — GABAPENTIN 100 MG: 100 CAPSULE ORAL at 13:18

## 2022-02-12 RX ADMIN — OXYCODONE HYDROCHLORIDE 10 MG: 10 TABLET ORAL at 08:23

## 2022-02-12 RX ADMIN — METHOCARBAMOL 1000 MG: 500 TABLET ORAL at 08:23

## 2022-02-12 RX ADMIN — Medication 100 MG: at 08:35

## 2022-02-12 ASSESSMENT — PAIN DESCRIPTION - ORIENTATION: ORIENTATION: RIGHT

## 2022-02-12 ASSESSMENT — PAIN SCALES - GENERAL
PAINLEVEL_OUTOF10: 9
PAINLEVEL_OUTOF10: 8
PAINLEVEL_OUTOF10: 10
PAINLEVEL_OUTOF10: 6
PAINLEVEL_OUTOF10: 9
PAINLEVEL_OUTOF10: 10
PAINLEVEL_OUTOF10: 8
PAINLEVEL_OUTOF10: 10
PAINLEVEL_OUTOF10: 9

## 2022-02-12 ASSESSMENT — PAIN DESCRIPTION - DESCRIPTORS
DESCRIPTORS: ACHING;JABBING
DESCRIPTORS: ACHING;DISCOMFORT
DESCRIPTORS: ACHING;JABBING

## 2022-02-12 ASSESSMENT — PAIN DESCRIPTION - LOCATION
LOCATION: BACK

## 2022-02-12 ASSESSMENT — PAIN DESCRIPTION - ONSET: ONSET: ON-GOING

## 2022-02-12 ASSESSMENT — PAIN DESCRIPTION - FREQUENCY: FREQUENCY: CONTINUOUS

## 2022-02-12 ASSESSMENT — PAIN DESCRIPTION - PAIN TYPE
TYPE: ACUTE PAIN

## 2022-02-12 NOTE — PROGRESS NOTES
OCCUPATIONAL THERAPY TREATMENT NOTE     [facility]      OT BEDSIDE TREATMENT NOTE      Date:2022  Patient Name: Hazel Church  MRN: [id number]  : 1995  Room: [address] [place] [address]A       OT orders received & appreciated, chart reviewed, currently awaiting TLSO. Will follow in accordance with NS & Ortho orders/protocol/POC. Thank you,  Claudio Shin, OTR/L   License #  [id number]

## 2022-02-12 NOTE — H&P
TRAUMA HISTORY & PHYSICAL  Surgical Resident/Advance Practice Nurse  2/11/2022  8:06 PM    PRIMARY SURVEY    CHIEF COMPLAINT:  Trauma consult. Injury occurred 2 days prior to arrival. Patient was cleaning a two story window when the window closed and caused her to fall forward. She was unable to come to the ED before because she was unable to get a ride. She landed on her lower back and buttock. No head injury or LOC. Not on blood thinner. Endorses right hip, right ankle and lumbar spine pain. Denies headache, changes in vision, nausea, vomiting, chest pain, abdominal pain, decrease sensation, numbness or tingling, bowel or bladder dysfunction. CT lumbar spine showed a moderate L2 superior endplate compression fx with retropulsion. Xray of right ankle shows malleolar fx. CT head and C-spine pending. AIRWAY:   Airway Normal  EMS ETT Absent  Noisy respirations Absent  Retractions: Absent  Vomiting/bleeding: Absent      BREATHING:    Midaxillary breath sound left:  Normal  Midaxillary breath sound right:  Normal    Cough sound intensity:  good   FiO2:  Room air    SMI 2000 mL.       CIRCULATION:   Femerol pulse intensity: Strong  Palpebral conjunctiva: Pink     Vitals:    02/11/22 1647   BP: 113/61   Pulse: 67   Resp: 18   Temp:    SpO2: 96%       Vitals:    02/11/22 1229 02/11/22 1450 02/11/22 1647   BP: 116/62 101/64 113/61   Pulse: 78 72 67   Resp: 17 18 18   Temp: 97.9 °F (36.6 °C)     TempSrc: Oral     SpO2: 99% 98% 96%   Weight: 105 lb (47.6 kg)     Height: 5' 1\" (1.549 m)          FAST EXAM: Deferred    Central Nervous System    GCS Initial 15 minutes   Eye  Motor  Verbal 4 - Opens eyes on own  6 - Follows simple motor commands  5 - Alert and oriented 4 - Opens eyes on own  6 - Follows simple motor commands  5 - Alert and oriented     Neuromuscular blockade: No  Pupil size:  Left 3 mm    Right 3 mm  Pupil reaction: Yes    Wiggles fingers: Left Yes Right Yes  Wiggles toes: Left Yes   Right Yes    Hand grasp: Left  Present      Right  Present  Plantar flexion: Left  Present      Right   Limited 2/2 pain    Loss of consciousness:  No    History Obtained From:  Patient & EMS  Private Medical Doctor:     Pre-exisiting Medical History:  yes    Conditions: asthma    Medications: methadone    Allergies: NKDA    Social History:   Tobacco use:  positive for approximately 1/2 packs per day. Patient advised to quit smoking  Alcohol use:  none  Illicit drug use:  methadone    Past Surgical History:  none    Anticoagulant use: None  Antiplatelet use:   None    NSAID use in last 72 hours: yes  Taken PCN in past:  unknown  Last food/drink: yesterday evening  Last tetanus: unknown    Family History:   No family history of anesthesia complications    Complaints:   Head:  None  Neck:   None  Chest:   None  Back:   Moderate lumbar midline and paraspinal pain  Abdomen:   None  Extremities:   Moderate  Comments:     Review of systems:  All negative unless otherwise noted. SECONDARY SURVEY  Head/scalp: Atraumatic    Face: Atraumatic    Eyes/ears/nose: Atraumatic    Pharynx/mouth: Atraumatic    Neck: Atraumatic     Cervical spine tenderness:   Cervical collar not indicated  Pain:  none  ROM:  Normal    Chest wall:  Atraumatic    Heart:  Regular rate & rhythm    Abdomen: Atraumatic. Soft ND  Tenderness:  none    Pelvis: Atraumatic  Tenderness: none    Thoracolumbar spine: Atraumatic  Tenderness:  Midline and paraspinal lumbar    Genitourinary:  Atraumatic. No blood or urine noted    Rectum: Atraumatic. No blood noted. Perineum: Atraumatic. No blood or urine noted.       Extremities:   Sensory normal  Motor normal  Bruising on bilateral thighs and left arm    Distal Pulses  Left arm normal  Right arm normal  Left leg normal  Right leg normal    Capillary refill  Left arm normal  Right arm normal  Left leg normal  Right leg normal    Procedures in ED:  none    In the event of Emergency Blood Transfusion:  Due to the critical condition of this patient, I request the immediate release of blood products for emergency transfusion secondary to shock. I understand the increased risks incurred by the lack of complete transfusion testing. Radiology: Xray lumbar spine, R ankle, R foot, R hip.  CT C+T spine, CT head    Consultations:  Orthopedic surgery and Neurosurgery    Admission/Diagnosis: L2 superior end plate compression fracture and R malleolar fx    Plan of Treatment:  Tert  Pain control  Neurosurgery recs  Ortho recs  Maintain spine neutrality  F/u CT C spine and head  F/u labs    Plan discussed with Dr. Honey Waterman    at 2/11/2022 on 8:06 PM    Electronically signed by Georgette Rebollar MD on 2/11/2022 at 8:06 PM

## 2022-02-12 NOTE — CONSULTS
[address]                  [address] [facility], [address]                                  CONSULTATION    PATIENT NAME: Abigail Cervantes                  :        1995  MED REC NO:   [id number]                            ROOM:       14B  ACCOUNT NO:   [de-identified]                           ADMIT DATE: 2022  PROVIDER:     Dexter Goyal MD    CONSULT DATE:  2022    REASON FOR CONSULT:  L2 compression fracture. HISTORY OF PRESENT ILLNESS:  The patient is a 20-year-old lady who fell  through a window approximately two days ago. Immediately after the  fall, she was complaining of excruciating back pain and also right lower  extremity pain and she was ultimately brought to [facility] today for further evaluation and management. Part of her  workup included a CT scan of her lumbar spine that showed an acute L2  compression fracture. As a result of that, neurosurgery service was  consulted. PAST MEDICAL HISTORY:  Positive for asthma and polysubstance abuse. PAST SURGICAL HISTORY:  Positive for wisdom teeth extraction. FAMILY HISTORY:  Positive for emphysema in her mother. SOCIAL HISTORY:  Positive for tobacco use. ALLERGIES:  She has no known drug allergies. HOME MEDICATIONS:  Include methadone. REVIEW OF SYSTEMS:  HEENT:  Negative for headache, double vision, or  blurry vision. CARDIOVASCULAR:  Negative for chest pain, arrhythmia, or  palpitations. RESPIRATORY:  Negative for shortness of breath, asthma,  bronchitis, or pneumonia. GASTROINTESTINAL:  Negative for heartburn,  nausea, vomiting, diarrhea, or constipation. GENITOURINARY:  Negative  for hematuria or dysuria. HEMATOLOGIC:  Positive for easy bruising. INFECTIOUS:  Negative for any recent infection. MUSCULOSKELETAL:   Positive for back pain. PSYCHIATRIC:  Negative for anxiety or  depression. NEUROLOGIC:  Negative for seizure or stroke. ENDOCRINE:   Negative for thyroid disorder or diabetes. PHYSICAL EXAMINATION:  VITAL SIGNS:  She is currently afebrile with a T-current of 36.6 degrees  Celsius, pulse 78, blood pressure is 116/62, respiratory rate is 17. GENERAL:  She is resting in bed. Does not appear to be in any acute  distress, appears her stated age. HEENT:  Her head is normocephalic and atraumatic. Pupils are 3 to 2 mm  and reactive. She has no drainage out of her eyes, ears, nose, or  throat. SKIN:  Skin is warm and dry. MUSCULOSKELETAL:  She has got decreased range of motion in her right  lower extremity. ABDOMEN:  Soft, nontender, nondistended. RESPIRATORY:  She is not using any accessory muscles of respiration. NEUROLOGIC:  On rest of her neurologic exam, she is awake, alert, and  oriented x3. Cranial nerves II through XII are intact bilaterally. Motor exam reveals 5/5 strength in her bilateral upper extremities and  her left lower extremity. Her right lower extremity is difficult to  assess due to pain as it is immobilized. Sensation is grossly intact to  light touch. Reflexes are 2+ and symmetric. Toes are going down. REVIEW OF IMAGING:  She had a CT scan of her lumbar spine that shows an  L2 compression fracture. ASSESSMENT AND PLAN:  The patient is a 53-year-old lady with an L2  compression fracture that is acute. The plan is to manage this in a  TLSO brace.         Frankie Yusuf MD    D: 02/11/2022 20:45:27       T: 02/11/2022 20:47:45     [email or web address]  Job#: [id number]     Doc#: [id number]    CC:

## 2022-02-12 NOTE — CONSULTS
Department of Orthopedic Surgery  Resident Consult Note          Reason for Consult:  R hip and ankle pain after a fall    HISTORY OF PRESENT ILLNESS:       Patient is a 27 y.o. female past medical history of polysubstance use currently on methadone, nicotine dependence who presents with right ankle pain after fall at home earlier today. Patient states that she was cleaning her windows on the second story of her house from the inside when she lost her balance and fell through the window landing outside on her back. Patient states that she had immediate onset back pain as well as right ankle pain. She denies any previous history of right foot or ankle problems. She currently community ambulator who walks without any assistive devices. Currently works as a cleaning lady. Denies numbness/tingling/paresthesias. Denies any other orthopedic complaints at this time. Past Medical History:        Diagnosis Date    Asthma     Drug abuse ([facility] [address]      Past Surgical History:        Procedure Laterality Date    WISDOM TOOTH EXTRACTION       Current Medications:   No current facility-administered medications for this encounter. Allergies:  Patient has no known allergies. Social History:   TOBACCO:   reports that she has been smoking cigarettes. She has been smoking about 1.00 pack per day. She has never used smokeless tobacco.  ETOH:   reports current alcohol use. DRUGS:   reports current drug use. Drug: IV. ACTIVITIES OF DAILY LIVING:    OCCUPATION: Environmental services  Family History:   History reviewed. No pertinent family history.     REVIEW OF SYSTEMS:  CONSTITUTIONAL:  negative for  fevers, chills  EYES:  negative for acute vision changes  HEENT:  negative for cough, hearing loss  RESPIRATORY:  negative for  dyspnea, wheezing  CARDIOVASCULAR:  negative for  chest pain  GASTROINTESTINAL:  negative for nausea, vomiting  GENITOURINARY:  negative for frequency, urinary incontinence  HEMATOLOGIC/LYMPHATIC: negative for bleeding  MUSCULOSKELETAL:  positive for right foot and back pain  NEUROLOGICAL:  negative for headaches, dizziness  BEHAVIOR/PSYCH:  negative for increased agitation and anxiety    PHYSICAL EXAM:    VITALS:  /74   Pulse 64   Temp 98.4 °F (36.9 °C)   Resp 18   Ht 5' 1\" (1.549 m)   Wt 105 lb (47.6 kg)   LMP 01/12/2022   SpO2 98%   BMI 19.84 kg/m²   CONSTITUTIONAL:  awake, alert, cooperative, tearful and anxious, and appears stated age  MUSCULOSKELETAL:  Right lower Extremity:  · Skin examination reveals ecchymosis and edema around the medial malleolus more prominent around the posterior aspect. This extends into the foot. No superficial lacerations or abrasions. No evidence of a open injury. No air edema or palpable fluctuance. · TTP over the medial malleolus. No TTP over the fibular head. No tenderness palpation in the foot, toes, shin, knee, thigh, hip  · Positive squeeze test  · Sensation intact light touch distally in sural, saphenous, tibial, deep insertion peroneal nerve distribution  · Motor function grossly intact distally in tibial, deep and superficial peroneal nerve distributions  · Compartments soft and compressible  · +2/4 DP PT pulses, foot warm well-perfused, brisk capillary refill in all toes    Secondary Exam:   · bilateralUE: No obvious signs of trauma. -TTP to fingers, hand, wrist, forearm, elbow, humerus, shoulder or clavicle. · leftLE: No obvious signs of trauma. -TTP to foot, ankle, leg, knee, thigh, hip.     · Pelvis: -TTP, -Log roll, -Heel strike     DATA:    CBC:   Lab Results   Component Value Date    WBC 6.8 09/28/2017    RBC 4.62 09/28/2017    HGB 14.0 09/28/2017    HCT 41.1 09/28/2017    MCV 89.0 09/28/2017    MCH 30.3 09/28/2017    MCHC 34.1 09/28/2017    RDW 11.4 09/28/2017     09/28/2017    MPV 10.5 09/28/2017     PT/INR:  No results found for: PROTIME, INR    Radiology Review:  XR 3 views of left ankle reviewed demonstrating a displaced medial malleolar fracture with no evidence of tibiofibular clear space widening or angulation of the talus within the plafond. There is a cortical lucency that extends from distal medial to proximal lateral in the plafond suspicious for nondisplaced posterior malleolus fracture. No other acute fracture dislocations noted. No other bony or soft tissue normalities. Stress for the ankle reveals the fracture as above with no widening of the ankle mortise. 3 views of the right ankle status post closed reduction demonstrate improved alignment of medial malleoli fragment. IMPRESSION:  · Closed, right medial malleoli fracture    PLAN:  · Nonweightbearing right lower extremity, well-padded 3 sided splint applied  · Admission per ED  · Pain control per ED  · Antibiotics as indicated by ED  · DVT Prophylaxis per ED  · No acute orthopedic invention planned  · Follow up with Dr. Maureen Piña  · All patient/family questions have been answered and patient is currently agreeable to plan.   · Discuss with Attending      Electronically signed by Rahul Brown DO on 2/11/2022 at 8:52 PM

## 2022-02-12 NOTE — PROGRESS NOTES
Physical Therapy  Name: Alize Cotton  Room: 7837/3550-P  Date: 02/12/22    PT consult received and appreciated. PT eval on hold, awaiting TLSO brace.      Neetu Ge, PT, DPT  [id number]

## 2022-02-12 NOTE — ED NOTES
Pt transport to floor is delayed due to patient wanting her boyfriend to bring her something from the car. This nurse allowed a 15 minute wait for the boyfriend who never returned. This nurse notified patient that she will need to have the boyfriend drop off any belongings with security if he returned before visiting hours. Pt was agreeable. Pt transported to floor.      Carmenza Chang RN  02/11/22 0275

## 2022-02-12 NOTE — ED NOTES
Patient used bedpan. Still c/o extreme pain.      [facility]  02/11/22 0422

## 2022-02-12 NOTE — PROGRESS NOTES
Trauma Tertiary Survey    Admit Date: 2/11/2022  Hospital day 0    CC:  Fall from 2nd [place] window 2 days prior. Alcohol pre-screening:  Women: How many times in the past year have you had 4 or more drinks in a day? none  How much do you drink on a daily basis? none    Scheduled Meds:   enoxaparin  30 mg SubCUTAneous BID    sodium chloride flush  5-40 mL IntraVENous 2 times per day    polyethylene glycol  17 g Oral Daily    acetaminophen  650 mg Oral Q6H    methocarbamol  1,000 mg Oral 4x Daily    gabapentin  100 mg Oral 3 times per day    lidocaine  20 mL IntraDERmal Once    lidocaine         Continuous Infusions:   sodium chloride       PRN Meds:sodium chloride flush, sodium chloride, ondansetron **OR** ondansetron, oxyCODONE **OR** oxyCODONE, bisacodyl    Subjective:     No new symptoms. Still endorsing lower back pain, right hip and ankle pain. States that pain is controlled on medication. Denies any headache, changes in vision, nausea, vomiting, chest pain, shortness of breath, abdominal pain, paresthesia in the extremities. Objective:     Patient Vitals for the past 8 hrs:   BP Temp Temp src Pulse Resp SpO2   02/12/22 0130 128/76 100.1 °F (37.8 °C) Temporal 71 18 100 %       No intake/output data recorded. No intake/output data recorded. Past Medical History:   Diagnosis Date    Asthma     Drug abuse ([facility] [address]        @[email or web address]@    Radiology:  XR ANKLE RIGHT (MIN 3 VIEWS)   Final Result   Slightly improved anatomic alignment of the right medial malleolus fracture   with suspected proximal extension into the distal tibial metaphysis. CT HEAD WO CONTRAST   Final Result   HEAD:      No acute intracranial hemorrhage or mass effect. CERVICAL SPINE:      No acute fracture or traumatic malalignment. CT CERVICAL SPINE WO CONTRAST   Final Result   HEAD:      No acute intracranial hemorrhage or mass effect. CERVICAL SPINE:      No acute fracture or traumatic malalignment. CT LUMBAR SPINE WO CONTRAST   Final Result   Moderate superior endplate compression fracture L2 vertebral body with 3 mm   of retropulsion. XR HIP 2-3 VW W PELVIS RIGHT   Final Result   No acute abnormality of the hip. XR FOOT RIGHT (MIN 3 VIEWS)   Final Result   No acute osseous abnormality of the foot. Mildly displaced anterior malleolar fracture. Please refer to the report of   the ankle. XR ANKLE RIGHT (MIN 3 VIEWS)   Final Result   Addendum 1 of 1   ADDENDUM:   Critical results were called by Dr. Sanford Moses to Dr. Jeannette Frias on 2/11/2022    at   14:49. Final   Lateral/anterior malleolar fracture with adjacent soft tissue swelling. XR LUMBAR SPINE (2-3 VIEWS)   Final Result   Compression deformity of the superior endplate of L2 vertebral body. Critical results were called by Dr. Sanford Moses to Dr. Jeannette Frias on 2/11/2022 at   2:49 p.m. PHYSICAL EXAM:     Central Nervous System  Loss of consciousness:  No    GCS:    Eye:  4 - Opens eyes on own  Motor:  6 - Follows simple motor commands  Verbal:  5 - Alert and oriented    Neuromuscular blockade: No  Pupil size:  Left 3 mm    Right 3 mm  Pupil reaction: Yes    Wiggles fingers: Left Yes Right Yes  Wiggles toes: Left Yes   Right Yes    Hand grasp:   Left  Present      Right  Present  Plantar flexion: Left  Present      Right  Limited by pain    PHYSICAL EXAM  General: No apparent distress, comfortable  HEENT: Trachea midline, no masses, Pupils equal round  Chest: Respiratory effort was normal with no retractions or use of accessory muscles. Cardiovascular: Extremities warm, well perfused, RRR  Abdomen:  Soft and non distended. No tenderness, guarding, rebound, or rigidity  Extremities: Moves all 4 extremeties, No pedal edema.  Ecchymosis on b/l LE and LUE    Spine:   Spine Tenderness ROM   Cervical 0/10 Normal   Thoracic 0/10 Normal   Lumbar 7/10 Normal     Musculoskeletal:    Joint Tenderness Swelling ROM Right shoulder Absent absent normal   Left shoulder absent absent normal   Right elbow absent absent normal   Left elbow absent absent normal   Right wrist absent absent normal   Left wrist absent absent normal   Right hand grasp Absent absent normal   Left hand grasp absent absent normal   Right hip Present absent normal   Left hip absent absent normal   Right knee Absent absent normal   Left knee absent absent normal   Right ankle Present absent normal   Left ankle absent absent normal   Right foot Present absent normal   Left foot absent absent normal       CONSULTS: Orthopedic surgery and Neurosurgery    PROCEDURES: none    INJURIES:      Active Problems:    Compression fracture of L2 lumbar vertebra, closed, initial encounter ([facility] [address]  Resolved Problems:    * No resolved hospital problems. *        Assessment/Plan:       · Neuro:  GCS 15, L2 sup endplate compression fx, TLSO brace per neurosurgery  · CV: HR near normal limits, no acute issues  · Pulm: tolerating room air   · GI: tolerating general diet  · Renal: no acute issues  · ID: afebrile, no acute issues    · Endocrine: no acute issues  · MSK: no acute issues, R malleolar fx, splint  · Heme: no acute issues     Bowel regime: glycolax, dulcolax  Pain control/Sedation: tylenol, robaxin, neurontin, josef  DVT prophylaxis: LVX/SCD    GI: diet  Glucose protocol: none  Mouth/Eye care: per patient.    Alvarez: none    Code status:    Full Code    Patient/Family update:  As available     Disposition:  Pending PT/OT      Electronically signed by Ad Hampton MD on 2/12/22 at 6:47 AM EST

## 2022-02-12 NOTE — PROGRESS NOTES
[address] [facility] in [facility] and confirmed methadone dosage as 100 mg daily.     Erika Horne MD  8:16 AM

## 2022-02-12 NOTE — DISCHARGE SUMMARY
Physician Discharge Summary     Patient ID:  Corey Officer  [id number]  27 y.o.  1995    Admit date: 2/11/2022    Discharge date and time: 02/14/22    Admitting Physician: Aliza Valdivia MD     Admission Diagnoses: Closed fracture of right ankle, initial encounter [S82.891A]  Compression fracture of L2 lumbar vertebra, closed, initial encounter [facility]) [S32.020A]    Discharge Diagnoses: Active Problems:    Compression fracture of L2 lumbar vertebra, closed, initial encounter ([facility]    Methadone maintenance therapy patient ([facility]    Closed displaced fracture of medial malleolus of right tibia    Cocaine abuse ([facility]  Resolved Problems:    * No resolved hospital problems. *      Admission Condition: fair    Discharged Condition: stable    Indication for Admission: L2 fracture, R malleolar fracture    Hospital Course/Procedures/Operation/treatments:   2/11 Injury occurred 2 days prior to arrival. Patient was cleaning a two story window when the window closed and caused her to fall forward. She landed on her lower back and buttock. No head injury or LOC. Not on blood thinner. Endorses right hip, right ankle and lumbar spine pain. Denies headache, changes in vision, nausea, vomiting, chest pain, abdominal pain, decrease sensation, numbness or tingling, bowel or bladder dysfunction. Moderate L2 superior endplate compression fx, R malleolar fx.  2/12 Tert unchanged, doing better, TLSO brace, splinted malleolar fx, PT/OT. 2/13 Awaiting PT/OT evaluation with TLSO brace. No new complaints or overnight events  2/14 TLSO at bedside. Pain is controlled.  PT/OT to see today            Consults:   IP CONSULT TO TRAUMA SURGERY  INPATIENT CONSULT TO ORTHOTIST/PROSTHETIST  IP CONSULT TO ORTHOPEDIC SURGERY  IP CONSULT TO NEUROSURGERY  IP CONSULT TO HOME CARE NEEDS    Significant Diagnostic Studies:   XR LUMBAR SPINE (2-3 VIEWS)    Result Date: 2/11/2022  EXAMINATION: THREE XRAY VIEWS OF THE LUMBAR SPINE 2/11/2022 2:27 pm COMPARISON: None. HISTORY: ORDERING SYSTEM PROVIDED HISTORY: fall. back pain TECHNOLOGIST PROVIDED HISTORY: Reason for exam:->fall. back pain What reading provider will be dictating this exam?->CRC FINDINGS: There is compression deformity of the superior endplate of L2 vertebral body. The lumbar spine is normally aligned. Discs are preserved in height. The pedicles are intact. Incidentally noted is a large amount of retained bowel gas and stool. Compression deformity of the superior endplate of L2 vertebral body. Critical results were called by Dr. Supa uBllock to Dr. Luis Fernando Goddard on 2/11/2022 at 2:49 p.m. XR ANKLE RIGHT (MIN 3 VIEWS)    Result Date: 2/11/2022  EXAMINATION: THREE XRAY VIEWS OF THE RIGHT ANKLE 2/11/2022 11:20 pm COMPARISON: Earlier today HISTORY: ORDERING SYSTEM PROVIDED HISTORY: post reduction at bedside TECHNOLOGIST PROVIDED HISTORY: Reason for exam:->post reduction at bedside What reading provider will be dictating this exam?->CRC FINDINGS: Redemonstration of right medial malleolus fracture in slightly improved anatomic alignment. Persistent linear lucency extending from the medial malleolus through the distal tibial metaphysis, concerning for proximal extension of the fracture. Widening of the medial ankle gutter again noted. No new osseous abnormality is identified. Slightly improved anatomic alignment of the right medial malleolus fracture with suspected proximal extension into the distal tibial metaphysis. XR ANKLE RIGHT (MIN 3 VIEWS)    Addendum Date: 2/11/2022    ADDENDUM: Critical results were called by Dr. Supa Bullock to Dr. Luis Fernando Goddard on 2/11/2022 at 14:49. Result Date: 2/11/2022  EXAMINATION: THREE XRAY VIEWS OF THE RIGHT ANKLE 2/11/2022 2:29 pm COMPARISON: None.  HISTORY: ORDERING SYSTEM PROVIDED HISTORY: fall TECHNOLOGIST PROVIDED HISTORY: Reason for exam:->fall What reading provider will be dictating this exam?->CRC FINDINGS: There is mildly displaced medial malleolar FINDINGS: HEAD: Parenchyma: No evidence of acute intracranial hemorrhage or mass effect. Ventricles: No evidence of hydrocephalus. Calvarium: No acute calvarial fracture is seen. CERVICAL SPINE: Bones: Vertebral body heights and alignment are maintained. No evidence of acute fracture. Prevertebral/other: No significant prevertebral soft tissue swelling. HEAD: No acute intracranial hemorrhage or mass effect. CERVICAL SPINE: No acute fracture or traumatic malalignment. CT CERVICAL SPINE WO CONTRAST    Result Date: 2/11/2022  EXAMINATION: CT OF THE HEAD WITHOUT CONTRAST; CT OF THE CERVICAL SPINE WITHOUT CONTRAST 2/11/2022 5:27 pm TECHNIQUE: CT of the head was performed without the administration of intravenous contrast. Dose modulation, iterative reconstruction, and/or weight based adjustment of the mA/kV was utilized to reduce the radiation dose to as low as reasonably achievable.; CT of the cervical spine was performed without the administration of intravenous contrast. Multiplanar reformatted images are provided for review. Dose modulation, iterative reconstruction, and/or weight based adjustment of the mA/kV was utilized to reduce the radiation dose to as low as reasonably achievable. COMPARISON: None. HISTORY: ORDERING SYSTEM PROVIDED HISTORY: fall TECHNOLOGIST PROVIDED HISTORY: Reason for exam:->fall Has a \"code stroke\" or \"stroke alert\" been called? ->No Decision Support Exception - unselect if not a suspected or confirmed emergency medical condition->Emergency Medical Condition ([place]) What reading provider will be dictating this exam?->CRC FINDINGS: HEAD: Parenchyma: No evidence of acute intracranial hemorrhage or mass effect. Ventricles: No evidence of hydrocephalus. Calvarium: No acute calvarial fracture is seen. CERVICAL SPINE: Bones: Vertebral body heights and alignment are maintained. No evidence of acute fracture. Prevertebral/other: No significant prevertebral soft tissue swelling.      HEAD: No acute intracranial hemorrhage or mass effect. CERVICAL SPINE: No acute fracture or traumatic malalignment. CT LUMBAR SPINE WO CONTRAST    Result Date: 2/11/2022  EXAMINATION: CT OF THE LUMBAR SPINE WITHOUT CONTRAST  2/11/2022 TECHNIQUE: CT of the lumbar spine was performed without the administration of intravenous contrast. Multiplanar reformatted images are provided for review. Adjustment of mA and/or kV according to patient size was utilized. Dose modulation, iterative reconstruction, and/or weight based adjustment of the mA/kV was utilized to reduce the radiation dose to as low as reasonably achievable. COMPARISON: None HISTORY: ORDERING SYSTEM PROVIDED HISTORY: fracture TECHNOLOGIST PROVIDED HISTORY: Reason for exam:->fracture Decision Support Exception - unselect if not a suspected or confirmed emergency medical condition->Emergency Medical Condition ([place]) What reading provider will be dictating this exam?->CRC FINDINGS: BONES/ALIGNMENT: Normal alignment. Moderate superior endplate compression fracture L2 vertebral body with 3 mm of retropulsion. DEGENERATIVE CHANGES: No significant degenerative changes of the lumbar spine. SOFT TISSUES/RETROPERITONEUM: No paraspinal mass is seen. Moderate superior endplate compression fracture L2 vertebral body with 3 mm of retropulsion. XR HIP 2-3 VW W PELVIS RIGHT    Result Date: 2/11/2022  EXAMINATION: ONE XRAY VIEW OF THE PELVIS AND TWO XRAY VIEWS RIGHT HIP 2/11/2022 2:31 pm COMPARISON: None. HISTORY: ORDERING SYSTEM PROVIDED HISTORY: fall, hip pain TECHNOLOGIST PROVIDED HISTORY: Reason for exam:->fall, hip pain What reading provider will be dictating this exam?->CRC FINDINGS: The hip demonstrates normal alignment. No evidence of acute fracture. No focal osseus lesion. Pelvis is intact. Incidentally noted is a large amount of retained bowel gas and stool in the visualized abdomen. No acute abnormality of the hip.        Discharge Exam:  NAD   smells of tobacco smoke  Lungs coarse  Normal sinus    Abdomen soft nontender nondistended  Right ankle splinted and casted    Disposition: home    In process/preliminary results:  Outstanding Order Results     No orders found from 1/13/2022 to 2/12/2022. Patient Instructions:   Current Discharge Medication List           Details   oxyCODONE HCl (OXY-IR) 10 MG immediate release tablet Take 1 tablet by mouth every 4 hours as needed for Pain for up to 7 days. Qty: 42 tablet, Refills: 0    Comments: Reduce doses taken as pain becomes manageable  Associated Diagnoses: Compression fracture of L2 lumbar vertebra, closed, initial encounter ([facility] Closed fracture of right ankle, initial encounter; Contusion of right hip, initial encounter; Closed displaced fracture of medial malleolus of right tibia, initial encounter      gabapentin (NEURONTIN) 100 MG capsule Take 1 capsule by mouth every 8 hours for 7 days. Qty: 21 capsule, Refills: 0      methocarbamol (ROBAXIN) 500 MG tablet Take 2 tablets by mouth 4 times daily for 10 days  Qty: 80 tablet, Refills: 0              Details   METHADONE HCL PO Take 100 mg by mouth daily              TRAUMA SERVICES DISCHARGE INSTRUCTIONS    Call [phone], option 2, for any questions/concerns and for follow-up appointment in 2 week(s). Please follow the instructions checked below:      Please follow-up with your primary care provider. ACTIVITY INSTRUCTIONS  Increase activity as tolerated  No heavy lifting or strenuous activity  Take your incentive spirometer home and use 4-6 times/day   [x]  No driving until cleared by neurosurgery, orthopedics    WOUND/DRESSING INSTRUCTIONS:  You may shower. No sitting in bath tub, hot tub or swimming until cleared by physician. Ice to areas of pain for first 24 hours. Heat to areas of pain after that. Wash areas of lacerations/abrasions with soap & water. Rinse well. Pat dry with clean towel.   Apply thin layer of Bacitracin, Neosporin, or triple antibiotic cream to affected area 2-3 times per day. Keep wounds clean and dry. []  Sutures/Staples are to be removed    MEDICATION INSTRUCTIONS  Take medication as prescribed. When taking pain medications, you may experience dizziness or drowsiness. Do not drink alcohol or drive when taking these medications. You may experience constipation while taking pain medication. You may take over the counter stool softeners such as docusate (Colace), sennosides S (Senokot-S), or Miralax. [x]  You may take Ibuprofen (over the counter) as directed for mild pain. --You may take up to 800mg every 8 hours for pain, please take with food or milk. [x]  You may take acetaminophen (Tylenol) products. Do NOT take more than 4000mg of Tylenol in 24h. [x]  Do not take any other acetaminophen (Tylenol) products if you are taking Percocet or Norco, as these contain Tylenol. --Do NOT take more than 4000mg of Tylenol in 24h. OPIOID MEDICATION INSTRUCTIONS  Read the medication guide that is included with your prescription. Take your medication exactly as prescribed. Store medication away from children and in a safe place. Do NOT share your medication with others. Do NOT take medication unless it is prescribed for you. Do NOT drink alcohol while taking opioids (I.e., Norco, Percocet, Oxycodone, etc). Discuss with the Trauma Clinic staff if the dose of medication you are taking does not control your pain and any side effects that you may be having. CALL 911 OR YOUR LOCAL EMERGENCY SERVICE:  --If you take too much medication  --If you have trouble breathing or shortness of breath  --A child has taken this medication. WORK:  You may not return to work until you receive follow-up with the Trauma Clinic or clearance by all consultants.     Call the trauma clinic for any of the following or for questions/concerns;  --fever over 101F  --redness, swelling, hardness or warmth at the wound site(s).   --Unrelieved nausea/vomiting  --Foul smelling or cloudy drainage at the wound site(s)  --Unrelieved pain or increase in pain  --Increase in shortness of breath    Follow-up:  Trauma Clinic: [phone] option [address]  [facility], [id number] [place] [address]           Follow up:   [address]  5 [facility] [phone]  Schedule an appointment as soon as possible for a visit in 2 weeks  for follow up     Time spent on discharge: more than 30 minutes  Signed:  CLAUDIA Sandoval NP  2/14/2022  2:47 PM

## 2022-02-12 NOTE — PROGRESS NOTES
Department of Neurosurgery  Attending Progress Note    CHIEF COMPLAINT:    SUBJECTIVE:  Seen today for lumbar compression fracture    ROS:  HEENT: negative for headache  CVS: nehative for chest pain  Resp: negative for SOB    OBJECTIVE  Physical  VITALS:  /76   Pulse 71   Temp 100.1 °F (37.8 °C) (Temporal)   Resp 18   Ht 5' 1\" (1.549 m)   Wt 105 lb (47.6 kg)   LMP 01/12/2022   SpO2 100%   BMI 19.84 kg/m²   NEUROLOGIC:  Mental Status Exam:  Level of Alertness:   awake  Orientation:   person, place, time  Motor Exam:  Motor exam is symmetrical 5 out of 5 all extremities bilaterally  Sensory:  Sensory intact    Data  CBC:   Lab Results   Component Value Date    WBC 6.8 09/28/2017    RBC 4.62 09/28/2017    HGB 14.0 09/28/2017    HCT 41.1 09/28/2017    MCV 89.0 09/28/2017    MCH 30.3 09/28/2017    MCHC 34.1 09/28/2017    RDW 11.4 09/28/2017     09/28/2017    MPV 10.5 09/28/2017     BMP:    Lab Results   Component Value Date     09/23/2017    K 4.0 09/23/2017     09/23/2017    CO2 24 09/23/2017    BUN 6 09/23/2017    LABALBU 5.1 09/22/2017    CREATININE 0.8 09/23/2017    CALCIUM 9.1 09/23/2017    GFRAA >60 09/23/2017    LABGLOM >60 09/23/2017    GLUCOSE 92 09/23/2017     Current Inpatient Medications  Current Facility-Administered Medications: enoxaparin (LOVENOX) injection 30 mg, 30 mg, SubCUTAneous, BID  sodium chloride flush 0.9 % injection 5-40 mL, 5-40 mL, IntraVENous, 2 times per day  sodium chloride flush 0.9 % injection 5-40 mL, 5-40 mL, IntraVENous, PRN  0.9 % sodium chloride infusion, 25 mL, IntraVENous, PRN  ondansetron (ZOFRAN-ODT) disintegrating tablet 4 mg, 4 mg, Oral, Q8H PRN **OR** ondansetron (ZOFRAN) injection 4 mg, 4 mg, IntraVENous, Q6H PRN  polyethylene glycol (GLYCOLAX) packet 17 g, 17 g, Oral, Daily  oxyCODONE (ROXICODONE) immediate release tablet 5 mg, 5 mg, Oral, Q4H PRN **OR** oxyCODONE HCl (OXY-IR) immediate release tablet 10 mg, 10 mg, Oral, Q4H PRN  acetaminophen (TYLENOL) tablet 650 mg, 650 mg, Oral, Q6H  methocarbamol (ROBAXIN) tablet 1,000 mg, 1,000 mg, Oral, 4x Daily  gabapentin (NEURONTIN) capsule 100 mg, 100 mg, Oral, 3 times per day  bisacodyl (DULCOLAX) EC tablet 5 mg, 5 mg, Oral, Daily PRN  lidocaine 1 % injection 20 mL, 20 mL, IntraDERmal, Once  lidocaine 1 % injection, , ,     ASSESSMENT AND PLAN:    Patient with lumbar compression fracture. Stable.   Await SWAPNILO bobo Davenport MD

## 2022-02-13 LAB
ANION GAP SERPL CALCULATED.3IONS-SCNC: 10 MMOL/L (ref 7–16)
BASOPHILS ABSOLUTE: 0.06 E9/L (ref 0–0.2)
BASOPHILS RELATIVE PERCENT: 0.7 % (ref 0–2)
BUN BLDV-MCNC: 13 MG/DL (ref 6–20)
CALCIUM SERPL-MCNC: 9 MG/DL (ref 8.6–10.2)
CHLORIDE BLD-SCNC: 104 MMOL/L (ref 98–107)
CO2: 28 MMOL/L (ref 22–29)
CREAT SERPL-MCNC: 0.8 MG/DL (ref 0.5–1)
EOSINOPHILS ABSOLUTE: 0.11 E9/L (ref 0.05–0.5)
EOSINOPHILS RELATIVE PERCENT: 1.3 % (ref 0–6)
GFR AFRICAN AMERICAN: >60
GFR NON-AFRICAN AMERICAN: >60 ML/MIN/1.73
GLUCOSE BLD-MCNC: 91 MG/DL (ref 74–99)
HCT VFR BLD CALC: 37.4 % (ref 34–48)
HEMOGLOBIN: 12.2 G/DL (ref 11.5–15.5)
IMMATURE GRANULOCYTES #: 0.03 E9/L
IMMATURE GRANULOCYTES %: 0.4 % (ref 0–5)
LYMPHOCYTES ABSOLUTE: 1.59 E9/L (ref 1.5–4)
LYMPHOCYTES RELATIVE PERCENT: 19.5 % (ref 20–42)
MCH RBC QN AUTO: 30.5 PG (ref 26–35)
MCHC RBC AUTO-ENTMCNC: 32.6 % (ref 32–34.5)
MCV RBC AUTO: 93.5 FL (ref 80–99.9)
MONOCYTES ABSOLUTE: 0.87 E9/L (ref 0.1–0.95)
MONOCYTES RELATIVE PERCENT: 10.6 % (ref 2–12)
NEUTROPHILS ABSOLUTE: 5.51 E9/L (ref 1.8–7.3)
NEUTROPHILS RELATIVE PERCENT: 67.5 % (ref 43–80)
PDW BLD-RTO: 11.2 FL (ref 11.5–15)
PLATELET # BLD: 316 E9/L (ref 130–450)
PMV BLD AUTO: 9.9 FL (ref 7–12)
POTASSIUM REFLEX MAGNESIUM: 4.9 MMOL/L (ref 3.5–5)
RBC # BLD: 4 E12/L (ref 3.5–5.5)
SODIUM BLD-SCNC: 142 MMOL/L (ref 132–146)
WBC # BLD: 8.2 E9/L (ref 4.5–11.5)

## 2022-02-13 PROCEDURE — 2580000003 HC RX 258: Performed by: STUDENT IN AN ORGANIZED HEALTH CARE EDUCATION/TRAINING PROGRAM

## 2022-02-13 PROCEDURE — 36415 COLL VENOUS BLD VENIPUNCTURE: CPT

## 2022-02-13 PROCEDURE — 80048 BASIC METABOLIC PNL TOTAL CA: CPT

## 2022-02-13 PROCEDURE — 6370000000 HC RX 637 (ALT 250 FOR IP): Performed by: STUDENT IN AN ORGANIZED HEALTH CARE EDUCATION/TRAINING PROGRAM

## 2022-02-13 PROCEDURE — 99232 SBSQ HOSP IP/OBS MODERATE 35: CPT | Performed by: SURGERY

## 2022-02-13 PROCEDURE — 85025 COMPLETE CBC W/AUTO DIFF WBC: CPT

## 2022-02-13 PROCEDURE — 6360000002 HC RX W HCPCS: Performed by: STUDENT IN AN ORGANIZED HEALTH CARE EDUCATION/TRAINING PROGRAM

## 2022-02-13 PROCEDURE — 1200000000 HC SEMI PRIVATE

## 2022-02-13 RX ADMIN — ACETAMINOPHEN 650 MG: 325 TABLET ORAL at 17:32

## 2022-02-13 RX ADMIN — OXYCODONE HYDROCHLORIDE 10 MG: 10 TABLET ORAL at 01:56

## 2022-02-13 RX ADMIN — METHOCARBAMOL 1000 MG: 500 TABLET ORAL at 17:32

## 2022-02-13 RX ADMIN — POLYETHYLENE GLYCOL 3350 17 G: 17 POWDER, FOR SOLUTION ORAL at 09:42

## 2022-02-13 RX ADMIN — ACETAMINOPHEN 650 MG: 325 TABLET ORAL at 23:25

## 2022-02-13 RX ADMIN — OXYCODONE HYDROCHLORIDE 10 MG: 10 TABLET ORAL at 23:25

## 2022-02-13 RX ADMIN — METHOCARBAMOL 1000 MG: 500 TABLET ORAL at 14:15

## 2022-02-13 RX ADMIN — ACETAMINOPHEN 650 MG: 325 TABLET ORAL at 11:39

## 2022-02-13 RX ADMIN — METHOCARBAMOL 1000 MG: 500 TABLET ORAL at 21:00

## 2022-02-13 RX ADMIN — OXYCODONE HYDROCHLORIDE 10 MG: 10 TABLET ORAL at 11:39

## 2022-02-13 RX ADMIN — ACETAMINOPHEN 650 MG: 325 TABLET ORAL at 01:55

## 2022-02-13 RX ADMIN — Medication 100 MG: at 09:42

## 2022-02-13 RX ADMIN — Medication 5 ML: at 09:47

## 2022-02-13 RX ADMIN — ENOXAPARIN SODIUM 30 MG: 100 INJECTION SUBCUTANEOUS at 09:42

## 2022-02-13 RX ADMIN — OXYCODONE HYDROCHLORIDE 10 MG: 10 TABLET ORAL at 06:39

## 2022-02-13 RX ADMIN — OXYCODONE HYDROCHLORIDE 10 MG: 10 TABLET ORAL at 17:32

## 2022-02-13 RX ADMIN — GABAPENTIN 100 MG: 100 CAPSULE ORAL at 21:00

## 2022-02-13 RX ADMIN — METHOCARBAMOL 1000 MG: 500 TABLET ORAL at 09:42

## 2022-02-13 RX ADMIN — GABAPENTIN 100 MG: 100 CAPSULE ORAL at 06:38

## 2022-02-13 RX ADMIN — ACETAMINOPHEN 650 MG: 325 TABLET ORAL at 06:39

## 2022-02-13 RX ADMIN — GABAPENTIN 100 MG: 100 CAPSULE ORAL at 14:15

## 2022-02-13 RX ADMIN — Medication 10 ML: at 21:00

## 2022-02-13 ASSESSMENT — PAIN SCALES - GENERAL
PAINLEVEL_OUTOF10: 8
PAINLEVEL_OUTOF10: 7
PAINLEVEL_OUTOF10: 7
PAINLEVEL_OUTOF10: 8
PAINLEVEL_OUTOF10: 10
PAINLEVEL_OUTOF10: 4

## 2022-02-13 ASSESSMENT — PAIN DESCRIPTION - ORIENTATION
ORIENTATION: RIGHT

## 2022-02-13 ASSESSMENT — PAIN DESCRIPTION - PAIN TYPE
TYPE: ACUTE PAIN

## 2022-02-13 ASSESSMENT — PAIN DESCRIPTION - DESCRIPTORS
DESCRIPTORS: ACHING;CONSTANT;DISCOMFORT
DESCRIPTORS: ACHING;DISCOMFORT;SORE
DESCRIPTORS: ACHING;CONSTANT;DISCOMFORT

## 2022-02-13 ASSESSMENT — PAIN DESCRIPTION - FREQUENCY
FREQUENCY: CONTINUOUS

## 2022-02-13 ASSESSMENT — PAIN DESCRIPTION - LOCATION
LOCATION: ANKLE;BACK
LOCATION: BACK;ANKLE
LOCATION: ANKLE;BACK
LOCATION: BACK;ANKLE

## 2022-02-13 ASSESSMENT — PAIN DESCRIPTION - ONSET
ONSET: ON-GOING

## 2022-02-13 NOTE — PROGRESS NOTES
Spoke with Radhika Roper from [place] regarding the pt's TLSO brace. Radhika Roper stated he will be here around 1300. Pt notified.

## 2022-02-13 NOTE — PROGRESS NOTES
[facility]  ATTENDING PHYSICIAN PROGRESS NOTE     I have examined the patient and  reviewed the record. I have reviewed all relevant labs and imaging data. The following summarizes my clinical findings and independent assessment. CC: fall from 2 story      S. Patient's mom is present at bedside. Patient is wearing her TLSO brace. The company is supposed to come today for measurements. She complains of lower back pain. Her mom states that her daughter is going to come and live with her. I asked the patient if she did not use any drugs she said no. She states that she takes methadone. I asked her about her urine drug screen that was positive for cocaine. She states that she did use cocaine 2 days ago. O.  NAD   smells of tobacco smoke  Lungs coarse  Normal sinus    Abdomen soft nontender nondistended  Right ankle splinted and casted    ASSESSMENT:  Active Problems:    Compression fracture of L2 lumbar vertebra, closed, initial encounter ([facility])    Methadone maintenance therapy patient ([facility]    Closed displaced fracture of medial malleolus of right tibia    Cocaine abuse ([facility]  Resolved Problems:    * No resolved hospital problems. *       PLAN:  Lumbar compression fracture-awaiting TLSO spinal neutrality until then. Monitor neuro status  Right medial malleolus of ankle-nonweightbearing right lower extremity, follow-up with orthopedics as an outpatient  Cocaine abuse-I spoke to the patient about her cocaine use she states that she only uses it occasionally. Her last dose was 2 days ago  History of methadone use-confirmed from [place]. Patient takes 100 mg daily. Continue    DVT Proph: SCDS/ lovenox       Mark La MD, FACS  2/13/2022  2:41 PM    NOTE: This report was transcribed using voice recognition software. Every effort was made to ensure accuracy; however, inadvertent computerized transcription errors may be present.

## 2022-02-13 NOTE — PROGRESS NOTES
Physical Therapy  Name: Wilma Telles  Room: 4455/8539-Y  Date: 02/13/22    PT consult received and appreciated.    Awaiting TLSO brace prior to PT session    Felicitas Navarro PT, DPT  [id number]

## 2022-02-13 NOTE — PROGRESS NOTES
Interventional Radiology Post Paracentesis/Thoracentesis Note    6/14/2018    Procedure(s): Ultrasound guided Therapeutic Paracentesis Performed with 8 Romansh catheter total volume 2750 ml. Preliminary Findings: moderate clear and yellow. Complications: None    Plan:  Observation, check labs if drawn.           Chest X-Ray:  no    Full dictated report to follow    Signed By: Matilde John RN OCCUPATIONAL THERAPY TREATMENT NOTE     N Merged with [facility]      OT BEDSIDE TREATMENT NOTE      Date:2022  Patient Name: Ryan Mancuso  MRN: [id number]  : 1995  Room: [address] [place] [address]-A       OT orders received, currently awaiting TLSO. Will check back at a later time. Hiwot Shin, OTR/L   License #  [id number]

## 2022-02-14 VITALS
SYSTOLIC BLOOD PRESSURE: 83 MMHG | TEMPERATURE: 97.8 F | HEART RATE: 68 BPM | RESPIRATION RATE: 16 BRPM | DIASTOLIC BLOOD PRESSURE: 58 MMHG | HEIGHT: 61 IN | BODY MASS INDEX: 19.83 KG/M2 | OXYGEN SATURATION: 96 % | WEIGHT: 105 LBS

## 2022-02-14 LAB
ANION GAP SERPL CALCULATED.3IONS-SCNC: 9 MMOL/L (ref 7–16)
BASOPHILS ABSOLUTE: 0.07 E9/L (ref 0–0.2)
BASOPHILS RELATIVE PERCENT: 1 % (ref 0–2)
BUN BLDV-MCNC: 15 MG/DL (ref 6–20)
CALCIUM SERPL-MCNC: 8.7 MG/DL (ref 8.6–10.2)
CHLORIDE BLD-SCNC: 102 MMOL/L (ref 98–107)
CO2: 25 MMOL/L (ref 22–29)
CREAT SERPL-MCNC: 0.7 MG/DL (ref 0.5–1)
EOSINOPHILS ABSOLUTE: 0.12 E9/L (ref 0.05–0.5)
EOSINOPHILS RELATIVE PERCENT: 1.7 % (ref 0–6)
GFR AFRICAN AMERICAN: >60
GFR NON-AFRICAN AMERICAN: >60 ML/MIN/1.73
GLUCOSE BLD-MCNC: 92 MG/DL (ref 74–99)
HCT VFR BLD CALC: 38 % (ref 34–48)
HEMOGLOBIN: 12.1 G/DL (ref 11.5–15.5)
IMMATURE GRANULOCYTES #: 0.05 E9/L
IMMATURE GRANULOCYTES %: 0.7 % (ref 0–5)
LYMPHOCYTES ABSOLUTE: 1.75 E9/L (ref 1.5–4)
LYMPHOCYTES RELATIVE PERCENT: 25.4 % (ref 20–42)
MCH RBC QN AUTO: 30.4 PG (ref 26–35)
MCHC RBC AUTO-ENTMCNC: 31.8 % (ref 32–34.5)
MCV RBC AUTO: 95.5 FL (ref 80–99.9)
MONOCYTES ABSOLUTE: 0.74 E9/L (ref 0.1–0.95)
MONOCYTES RELATIVE PERCENT: 10.8 % (ref 2–12)
NEUTROPHILS ABSOLUTE: 4.15 E9/L (ref 1.8–7.3)
NEUTROPHILS RELATIVE PERCENT: 60.4 % (ref 43–80)
PDW BLD-RTO: 11.2 FL (ref 11.5–15)
PLATELET # BLD: 349 E9/L (ref 130–450)
PMV BLD AUTO: 9.9 FL (ref 7–12)
POTASSIUM REFLEX MAGNESIUM: 4.6 MMOL/L (ref 3.5–5)
RBC # BLD: 3.98 E12/L (ref 3.5–5.5)
SODIUM BLD-SCNC: 136 MMOL/L (ref 132–146)
WBC # BLD: 6.9 E9/L (ref 4.5–11.5)

## 2022-02-14 PROCEDURE — 97530 THERAPEUTIC ACTIVITIES: CPT

## 2022-02-14 PROCEDURE — 97161 PT EVAL LOW COMPLEX 20 MIN: CPT

## 2022-02-14 PROCEDURE — 2580000003 HC RX 258: Performed by: STUDENT IN AN ORGANIZED HEALTH CARE EDUCATION/TRAINING PROGRAM

## 2022-02-14 PROCEDURE — 99238 HOSP IP/OBS DSCHRG MGMT 30/<: CPT | Performed by: SURGERY

## 2022-02-14 PROCEDURE — 80048 BASIC METABOLIC PNL TOTAL CA: CPT

## 2022-02-14 PROCEDURE — L0464 TLSO 4MOD SACRO-SCAP PRE: HCPCS

## 2022-02-14 PROCEDURE — 36415 COLL VENOUS BLD VENIPUNCTURE: CPT

## 2022-02-14 PROCEDURE — 85025 COMPLETE CBC W/AUTO DIFF WBC: CPT

## 2022-02-14 PROCEDURE — 97165 OT EVAL LOW COMPLEX 30 MIN: CPT

## 2022-02-14 PROCEDURE — 6370000000 HC RX 637 (ALT 250 FOR IP): Performed by: STUDENT IN AN ORGANIZED HEALTH CARE EDUCATION/TRAINING PROGRAM

## 2022-02-14 PROCEDURE — 6360000002 HC RX W HCPCS: Performed by: STUDENT IN AN ORGANIZED HEALTH CARE EDUCATION/TRAINING PROGRAM

## 2022-02-14 RX ORDER — METHOCARBAMOL 500 MG/1
1000 TABLET, FILM COATED ORAL 4 TIMES DAILY
Qty: 80 TABLET | Refills: 0 | Status: SHIPPED | OUTPATIENT
Start: 2022-02-14 | End: 2022-02-24

## 2022-02-14 RX ORDER — GABAPENTIN 100 MG/1
100 CAPSULE ORAL EVERY 8 HOURS SCHEDULED
Qty: 21 CAPSULE | Refills: 0 | Status: SHIPPED | OUTPATIENT
Start: 2022-02-14 | End: 2022-03-09 | Stop reason: SDUPTHER

## 2022-02-14 RX ORDER — OXYCODONE HYDROCHLORIDE 10 MG/1
10 TABLET ORAL EVERY 4 HOURS PRN
Qty: 42 TABLET | Refills: 0 | Status: SHIPPED | OUTPATIENT
Start: 2022-02-14 | End: 2022-02-21

## 2022-02-14 RX ADMIN — Medication 10 ML: at 08:16

## 2022-02-14 RX ADMIN — OXYCODONE HYDROCHLORIDE 10 MG: 10 TABLET ORAL at 05:17

## 2022-02-14 RX ADMIN — METHOCARBAMOL 1000 MG: 500 TABLET ORAL at 08:15

## 2022-02-14 RX ADMIN — ACETAMINOPHEN 650 MG: 325 TABLET ORAL at 11:59

## 2022-02-14 RX ADMIN — GABAPENTIN 100 MG: 100 CAPSULE ORAL at 13:50

## 2022-02-14 RX ADMIN — ACETAMINOPHEN 650 MG: 325 TABLET ORAL at 05:17

## 2022-02-14 RX ADMIN — Medication 100 MG: at 08:14

## 2022-02-14 RX ADMIN — OXYCODONE HYDROCHLORIDE 10 MG: 10 TABLET ORAL at 12:31

## 2022-02-14 RX ADMIN — GABAPENTIN 100 MG: 100 CAPSULE ORAL at 05:17

## 2022-02-14 RX ADMIN — ENOXAPARIN SODIUM 30 MG: 100 INJECTION SUBCUTANEOUS at 08:14

## 2022-02-14 RX ADMIN — POLYETHYLENE GLYCOL 3350 17 G: 17 POWDER, FOR SOLUTION ORAL at 08:15

## 2022-02-14 RX ADMIN — METHOCARBAMOL 1000 MG: 500 TABLET ORAL at 15:03

## 2022-02-14 ASSESSMENT — PAIN DESCRIPTION - LOCATION: LOCATION: ANKLE;BACK

## 2022-02-14 ASSESSMENT — PAIN DESCRIPTION - PAIN TYPE: TYPE: ACUTE PAIN

## 2022-02-14 ASSESSMENT — PAIN DESCRIPTION - DESCRIPTORS: DESCRIPTORS: ACHING;DISCOMFORT;SORE

## 2022-02-14 ASSESSMENT — PAIN DESCRIPTION - ORIENTATION: ORIENTATION: RIGHT

## 2022-02-14 ASSESSMENT — PAIN DESCRIPTION - ONSET: ONSET: ON-GOING

## 2022-02-14 ASSESSMENT — PAIN SCALES - GENERAL
PAINLEVEL_OUTOF10: 5
PAINLEVEL_OUTOF10: 8
PAINLEVEL_OUTOF10: 8
PAINLEVEL_OUTOF10: 7

## 2022-02-14 ASSESSMENT — PAIN DESCRIPTION - PROGRESSION: CLINICAL_PROGRESSION: NOT CHANGED

## 2022-02-14 ASSESSMENT — PAIN DESCRIPTION - FREQUENCY: FREQUENCY: CONTINUOUS

## 2022-02-14 NOTE — PROGRESS NOTES
Physical Therapy  Physical Therapy Initial Assessment     Name: Lyn Lancaster  : 1995  MRN: [id number]      Date of Service: 2022    Evaluating PT:  Hector Nunes, PT, DPT [id number]     Room #:  6824/6199-B  Diagnosis:  Closed fracture of right ankle, initial encounter [R08.605I]  Compression fracture of L2 lumbar vertebra, closed, initial [facility] ([facility] [S32.020A]  Reason for admission: Fall, R ankle fx, L2 fx  Precautions:  Falls, RLE NWB, spinal neutral, TLSO  Procedure/Surgery:  None   Equipment Recommendations:  FWW     SUBJECTIVE:  Pt will be DCing to moms home. 1 floor setup with 2 steps no rails to enter. Pt ambulated with no AD PTA. OBJECTIVE:   Initial Evaluation  Date:  Treatment   Short Term/ Long Term   Goals   AM-PAC 6 Clicks 72/57     Was pt agreeable to Eval/treatment? Yes      Does pt have pain?  Back /10     Bed Mobility  Rolling: SBA  Supine to sit: Luis Alfredo  Sit to supine: NT  Scooting: SBA  Independent    Transfers Sit to stand: SBA  Stand to sit: SBA  Stand pivot: NT  Independent    Ambulation    30 feet x2 with [employer] SBA    >150 feet with AAD Mod I    Stair negotiation: ascended and descended  NT  2 steps with 1 rail Mod I      LE ROM: R ankle in splint    LE Strength:   knee ext: 5/5  ankle DF: 5/5, R ankle NT    Balance:   Sitting static: Independent  Sitting dynamic: Independent  Standing static: SBA   Standing dynamic: SBA [employer]      -Pt is A & O x 3  -Sensation:  Pt denies numbness and tingling to extremities  -Edema:  unremarkable     Therapeutic Exercises:  Functional activity     Patient education  Pt educated on safety, sequencing of transfers, and role of PT    Patient response to education:   Pt verbalized understanding Pt demonstrated skill Pt requires further education in this area   Yes  Yes  Yes      ASSESSMENT:  Conditions Requiring Skilled Therapeutic Intervention:  []Decreased strength     []Decreased ROM  [x]Decreased functional mobility  [x]Decreased balance [x]Decreased endurance   []Decreased posture  []Decreased sensation  []Decreased coordination   []Decreased vision  [x]Decreased safety awareness   [x]Increased pain       Comments:  Pt received supine in bed and agreeable to PT session   Pt educated on use of brace, precautions, and weight bearing restrictions. She does require continued reminders to maintain precautions. Light assist is needed for bed mobility, otherwise, she is able to stand and ambulate with [employer] unaided. She does have limited endurance and c/o R ankle splint being heavy. Offered to trial stairs and use of crutches but pt declined both. Verbally discussed stair negotiation with pt expressing understanding. Sitting in chair to end session   Pt with all needs met and call light in reach. Pt would benefit from continued PT POC to address functional deficits described above. Treatment:  Patient practiced and was instructed in the following treatment:     Therapeutic activity  o Patient education provided continuously throughout session for sequencing, safety maintenance, and improving any deficits found during the evaluation. o Bed mobility training - pt given verbal and tactile cues to facilitate proper sequencing and safety during rolling and supine>sit as well as provided with physical assistance to complete task    o STS and pivot transfer training - pt educated on proper hand and foot placement, safety and sequencing, and use of proper technique to safely complete sit<>stand and pivot transfers with hands on assistance to complete task safely   o Gait training- pt was given verbal and tactile cues to facilitate improved endurance and NWB compliance during ambulation as well as provided with physical assistance. o Verbal education on stair training    Pt's/ family goals   1. Return home     Patient and or family understand(s) diagnosis, prognosis, and plan of care. Yes     Prognosis is good for reaching above PT goals.     PHYSICAL training 25672 - minutes  [] Manual therapy [phone] - minutes  [x] Therapeutic activities 41786 10 minutes  [] Therapeutic exercises 85242 - minutes  [] Neuromuscular reeducation 62869 - minutes     Carisa Terry, PT, DPT  [id number]

## 2022-02-14 NOTE — PROGRESS NOTES
CLINICAL PHARMACY NOTE: MEDS TO BEDS    Total # of Prescriptions Filled: 3   The following medications were delivered to the patient:  · Methocarbamol 500 mg  · Gabapentin 100 mg  · Oxycodone 10 mg    Additional Documentation:

## 2022-02-14 NOTE — PROGRESS NOTES
[address]  [address] [place] [address]      Date:2022                 Patient Name: Sabiha Ponce  MRN: [id number]  : 1995  Room: [address] [place] [address]-    Referring Provider: Awais Anderson MD  Specific Provider Orders/Date: OT evaluation and treat 22    Evaluating OT: Ronen Mark, OTR/L #[id number]    Diagnosis: Closed fracture of right ankle, initial encounter [S82.891A]  Compression fracture of L2 lumbar vertebra, closed, initial encounter [facility]) [S32.020A]      Surgery:   Past Surgical History:   Procedure Laterality Date    WISDOM TOOTH EXTRACTION            Pertinent Medical History:  has a past medical history of Asthma and Drug abuse ([facility] [address]      Precautions:  Fall Risk, spinal precautions, TLSO brace, NWB to RLE    Assessment of current deficits   [x] Functional mobility  [x]ADLs  [] Strength               []Cognition   [x] Functional transfers   [x] IADLs         [x] Safety Awareness   [x]Endurance   [] Fine Coordination              [] Balance      [] Vision/perception   []Sensation    []Gross Motor Coordination  [] ROM  [] Delirium                   [] Motor Control     OT PLAN OF CARE   OT POC based on physician orders, patient diagnosis and results of clinical assessment    Frequency/Duration   1-3 days/wk for 1 week PRN   Specific OT Treatment Interventions to include:   * Instruction/training on adapted ADL techniques and AE recommendations to increase functional independence within precautions       * Training on energy conservation strategies, correct breathing pattern and techniques to improve independence/tolerance for self-care routine  * Functional transfer/mobility training/DME recommendations for increased independence, safety, and fall prevention  * Patient/Family education to increase follow through with safety techniques and functional independence  * Recommendation of environmental modifications for increased safety with functional transfers/mobility and ADLs  * Therapeutic activities to facilitate/challenge dynamic balance, stand tolerance for increased safety and independence with ADLs  * Therapeutic activities to facilitate gross/fine motor skills for increased independence with ADLs      Recommended Adaptive Equipment: TBD ww, BSC, AE for LE dressing and bathing , shower seat    Home Living: Pt will be discharging to mothers home  in a one  story home with 2 steps and no hand rails. Bed and bath on main  floor.   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  Medication management: self  Leisure: not stated    Pain Level: 8/10 back and RLE pain    Cognition: A&O: 4/4; Follows one step directions   Memory:  Fair+ recall of spinal precautions and NWb to RLE   Sequencing:  Fair+   Problem solving:  Fair+   Judgement/safety:  Fair+     Functional Assessment:  AM-PAC Daily Activity Raw Score: 17/24   Initial Eval Status  Date: 2/14/22 Treatment Status  Date: STGs = LTGs  Time frame: 2-4days   Feeding Independent     Grooming SBA standing with cues to keep one hand on walker at all times  Mod I sitting   UB Dressing Min A TLSO donning  Mod I    LB Dressing Mod A   Mod I with AE prn   Bathing Simulated min A    mod I seated with LHS   Toileting Min A able to perform bladder hygiene  Mod I    Bed Mobility  Supine to sit: min A   Sit to supine:  SBA  Supine to sit: mod I log rolling  Sit to supine: mod I    Functional Transfers CGA with ww  Mod I with ww   Functional Mobility CGA with ww  Mod I with ww   Balance Sitting:     Static:  good    Dynamic:SBA  Standing: CGA                                                                       Activity Tolerance Fair limited by pain increased with RLE down O2WFL  Good for ADL completion   Visual/  Perceptual             intact         Safety Fair+                                  Good follow through with spinal and NWB precautions     Hand Dominance R    AROM (PROM) Strength Additional Info:    RUE  WFL 4+/5 good  and wfl FMC/dexterity noted during ADL tasks       LUE WFL 4+/5 good  and wfl FMC/dexterity noted during ADL tasks     Hearing: WFL   Sensation:   No c/o numbness or tingling   Tone: WFL   Edema: none noted    Comments: Upon arrival patient supine and agreeable to evaluation. Therapist educated pt regarding role of OT, spinal precautions and ADL completion. Therapist facilitated donning of TLSO brace in supine , bed mobility utilizing log roll technique, unsupported sitting balance, standing balance tasks, functional transfers (various surfaces) and functional ambulation with w/w - skilled cuing on hand placement, body mechanics and safety. Therapist facilitated self-care retraining: UB/LB self-care tasks,  toileting task and standing grooming task while educating pt on modified techniques within precautions, posture, safety and energy conservation techniques. Skilled monitoring of HR, O2 sats and pts response to treatment. Performed OT evaluation with education on precautions and safety. At end of session, patient sitting up in chair and then back to bed due to RLE with increase pain and  with call light and phone within reach, all lines and tubes intact. Nursing notified. Overall patient demonstrated min  decreased independence and safety during completion of ADL/functional transfer/mobility tasks. Pt would benefit from continued skilled OT to increase safety and independence with completion of ADL/IADL tasks for functional independence and quality of life.     Treatment: OT treatment provided this date includes:    Instruction/training on safety and adapted techniques for completion of ADLs: to increase [place] in self care with AE/DME prn    Instruction/training on safe functional mobility/transfer techniques: with focus on safety, technique & precautions   ·  Instruction/training on energy conservation/work simplification for completion of ADLs: techniques to increase [place] with self care ADLs & iADLs, work     simplification to improve endurance  ·  Proper Positioning/Alignment: for optimal healing, skin integrity to prevent breakdown, decrease edema  · Skilled monitoring of vitals: to include BP, spO2 & HR during session  · Sitting/standing Balance/Tolerance- to increased balance & activity tolerance during ADLs as well as facilitate proper posture and/or positioning. Rehab Potential: Good  for established goals     Patient / Family Goal: decrease pain       Patient and/or family were instructed on functional diagnosis, prognosis/goals and OT plan of care. Demonstrated good understanding. Eval Complexity: low    Time In:  7:50  Time Out: 8:15  Total Treatment Time: 10 min. Min Units   OT Eval Low 39978  X     OT Eval Medium 72532      OT Eval High [id number]       OT Re-Eval [id number]       Therapeutic Ex [phone]       Therapeutic Activities 85324  10  1   ADL/Self Care 57983       Orthotic Management 22472       Neuro Re-Ed 27062       Non-Billable Time          Evaluation Time includes thorough review of current medical information, gathering information on past medical history/social history and prior level of function, completion of standardized testing/informal observation of tasks, assessment of data and education on plan of care and goals. Isabel Bhagat.  Stacie [address], [address]

## 2022-02-14 NOTE — PROGRESS NOTES
[facility]  SURGICAL INTENSIVE CARE UNIT (SICU)  ATTENDING PHYSICIAN CRITICAL CARE PROGRESS NOTE     I have examined the patient, reviewed the record,and discussed the case with the APN/  Resident. I have reviewed all relevant labs and imaging data. The following summarizes my clinical findings and independent assessment. Date of admission:  2/11/2022    CC: Ronny COURSE:   2/11 Injury occurred 2 days prior to arrival. Patient was cleaning a two story window when the window closed and caused her to fall forward. She landed on her lower back and buttock. No head injury or LOC. Not on blood thinner. Endorses right hip, right ankle and lumbar spine pain. Denies headache, changes in vision, nausea, vomiting, chest pain, abdominal pain, decrease sensation, numbness or tingling, bowel or bladder dysfunction. Moderate L2 superior endplate compression fx, R malleolar fx.  2/12 Tert unchanged, doing better, TLSO brace, splinted malleolar fx, PT/OT. 2/13 Awaiting PT/OT evaluation with TLSO brace. No new complaints or overnight events  2/14 TLSO at bedside. Pain is controlled. PT/OT to see today, tolerating a diet, positive bowel function          New Imaging Reviewed and personally interpreted :   No new imaging    New labs reviewed BMP unremarkable CBC unremarkable hemoglobin 12.1    Physical Exam:  Physical Exam  Constitutional:       Appearance: Normal appearance. HENT:      Head: Normocephalic and atraumatic. Mouth/Throat:      Mouth: Mucous membranes are dry. Eyes:      Extraocular Movements: Extraocular movements intact. Pupils: Pupils are equal, round, and reactive to light. Cardiovascular:      Rate and Rhythm: Normal rate and regular rhythm. Pulmonary:      Effort: Pulmonary effort is normal. No respiratory distress. Abdominal:      General: Abdomen is flat. There is no distension. Tenderness: There is no abdominal tenderness.  There is no guarding or rebound. Musculoskeletal:         General: Normal range of motion. Cervical back: Normal range of motion and neck supple. Comments: 5 out of 5 strength except for right lower extremity casted, brisk capillary refill sensation intact   Skin:     General: Skin is warm. Neurological:      General: No focal deficit present. Mental Status: She is alert. Psychiatric:         Mood and Affect: Mood normal.         Behavior: Behavior normal.           Assessment   Active Problems:    Compression fracture of L2 lumbar vertebra, closed, initial encounter ([facility])    Methadone maintenance therapy patient ([facility] [address]    Closed displaced fracture of medial malleolus of right tibia    Cocaine abuse ([facility] [address]  Resolved Problems:    * No resolved hospital problems.  *      Plan   Regular diet and bowel regimen  Pain controlled on Tylenol, Aloxi, methadone, gabapentin, Robaxin  Neurosurgery following for L2 fracture TLSO brace  Ortho following for right malleolus fracture nonweightbearing right lower extremity  Patient got a 6924 after her TLSO brace she does not want to go to rehab will discharge home with physical therapy  PCD's Amarilis Valverde MD

## 2022-02-14 NOTE — CARE COORDINATION
2/14/22 Transition of Care: patient is here after sustaining a fall out the window. She sustained an ankle fracture/lumbar fx. She is alert and oriented. She is a patient at [facility] for methadone and follows with pcp. Her drug screen was positive for cocaine. She is to have a tlso brace. She will need a FWW for home. She does not use dme equipment currently. She will be discharging to her mothers home. [facility] dme referral sent for [facility] for discharge. Will await trauma to [facility] for possible discharge home today.  Electronically signed by Tali Merchant RN CM on 2/14/2022 at 12:35 PM

## 2022-02-14 NOTE — CARE COORDINATION
2/14/22 Update Cm note; Order received for homecare for therapies as an outpatient. No preference per family.  [facility] called and given referral. Electronically signed by Aleda Boas, RN CM on 2/14/2022 at 2:51 PM

## 2022-02-17 ENCOUNTER — TELEPHONE (OUTPATIENT)
Dept: ORTHOPEDIC SURGERY | Age: 27
End: 2022-02-17

## 2022-02-17 DIAGNOSIS — S82.51XA CLOSED DISPLACED FRACTURE OF MEDIAL MALLEOLUS OF RIGHT TIBIA, INITIAL ENCOUNTER: Primary | ICD-10-CM

## 2022-02-25 ENCOUNTER — TELEPHONE (OUTPATIENT)
Dept: SURGERY | Age: 27
End: 2022-02-25

## 2022-02-25 NOTE — TELEPHONE ENCOUNTER
MA attempted to contact patient to schedule follow up with Trauma Clinic, s/p fall from window. MA attempted both phone numbers and either are working numbers at this time.   Electronically signed by Satish Quintanilla MA on 2/25/22 at 10:25 AM EST

## 2022-03-01 ENCOUNTER — TELEPHONE (OUTPATIENT)
Dept: ADMINISTRATIVE | Age: 27
End: 2022-03-01

## 2022-03-01 NOTE — TELEPHONE ENCOUNTER
We cannot refill her meds since she has not been seen in office yet. She no showed on 2/23. We can add her on 3/4 and refill her meds at that time.

## 2022-03-01 NOTE — TELEPHONE ENCOUNTER
Please advise scheduling Hospital f/u 2-11 to 2-14 for fracture of right ankle and L2 lumbar vertebra.     Patient also inquiring about med refill of the following;  Gabapentin, Oxy, Methocarbamol

## 2022-03-02 NOTE — TELEPHONE ENCOUNTER
Future Appointments   Date Time Provider Dhara Suero   3/14/2022 11:45 AM Yvrose Hoang PA-C [address]  [facility]     Advised patient to alternate tylenol and ibuprofen, ice and elevate to help relieve pain until reevaluated in office.

## 2022-03-04 ENCOUNTER — OFFICE VISIT (OUTPATIENT)
Dept: ORTHOPEDIC SURGERY | Age: 27
End: 2022-03-04
Payer: COMMERCIAL

## 2022-03-04 ENCOUNTER — TELEPHONE (OUTPATIENT)
Dept: ORTHOPEDIC SURGERY | Age: 27
End: 2022-03-04

## 2022-03-04 ENCOUNTER — OFFICE VISIT (OUTPATIENT)
Dept: SURGERY | Age: 27
End: 2022-03-04
Payer: COMMERCIAL

## 2022-03-04 ENCOUNTER — HOSPITAL ENCOUNTER (OUTPATIENT)
Dept: GENERAL RADIOLOGY | Age: 27
Discharge: HOME | End: 2022-03-06
Payer: COMMERCIAL

## 2022-03-04 ENCOUNTER — HOSPITAL ENCOUNTER (OUTPATIENT)
Dept: CT IMAGING | Age: 27
Discharge: HOME | End: 2022-03-06
Payer: COMMERCIAL

## 2022-03-04 VITALS
HEIGHT: 61 IN | HEART RATE: 61 BPM | RESPIRATION RATE: 16 BRPM | DIASTOLIC BLOOD PRESSURE: 76 MMHG | WEIGHT: 105 LBS | BODY MASS INDEX: 19.83 KG/M2 | SYSTOLIC BLOOD PRESSURE: 105 MMHG

## 2022-03-04 VITALS — TEMPERATURE: 98.2 F

## 2022-03-04 DIAGNOSIS — S82.51XA CLOSED DISPLACED FRACTURE OF MEDIAL MALLEOLUS OF RIGHT TIBIA, INITIAL ENCOUNTER: ICD-10-CM

## 2022-03-04 DIAGNOSIS — T14.90XA TRAUMA: Primary | ICD-10-CM

## 2022-03-04 DIAGNOSIS — Z11.59 SCREENING FOR VIRAL DISEASE: ICD-10-CM

## 2022-03-04 DIAGNOSIS — S82.51XA CLOSED DISPLACED FRACTURE OF MEDIAL MALLEOLUS OF RIGHT TIBIA, INITIAL ENCOUNTER: Primary | ICD-10-CM

## 2022-03-04 PROCEDURE — 99202 OFFICE O/P NEW SF 15 MIN: CPT | Performed by: PHYSICIAN ASSISTANT

## 2022-03-04 PROCEDURE — 4004F PT TOBACCO SCREEN RCVD TLK: CPT | Performed by: PHYSICIAN ASSISTANT

## 2022-03-04 PROCEDURE — 1111F DSCHRG MED/CURRENT MED MERGE: CPT | Performed by: CLINICAL NURSE SPECIALIST

## 2022-03-04 PROCEDURE — G8484 FLU IMMUNIZE NO ADMIN: HCPCS | Performed by: PHYSICIAN ASSISTANT

## 2022-03-04 PROCEDURE — 99212 OFFICE O/P EST SF 10 MIN: CPT | Performed by: CLINICAL NURSE SPECIALIST

## 2022-03-04 PROCEDURE — 73700 CT LOWER EXTREMITY W/O DYE: CPT

## 2022-03-04 PROCEDURE — G8427 DOCREV CUR MEDS BY ELIG CLIN: HCPCS | Performed by: PHYSICIAN ASSISTANT

## 2022-03-04 PROCEDURE — 29515 APPLICATION SHORT LEG SPLINT: CPT | Performed by: PHYSICIAN ASSISTANT

## 2022-03-04 PROCEDURE — G8420 CALC BMI NORM PARAMETERS: HCPCS | Performed by: CLINICAL NURSE SPECIALIST

## 2022-03-04 PROCEDURE — G8420 CALC BMI NORM PARAMETERS: HCPCS | Performed by: PHYSICIAN ASSISTANT

## 2022-03-04 PROCEDURE — G8428 CUR MEDS NOT DOCUMENT: HCPCS | Performed by: CLINICAL NURSE SPECIALIST

## 2022-03-04 PROCEDURE — 73610 X-RAY EXAM OF ANKLE: CPT

## 2022-03-04 PROCEDURE — G8484 FLU IMMUNIZE NO ADMIN: HCPCS | Performed by: CLINICAL NURSE SPECIALIST

## 2022-03-04 PROCEDURE — 1111F DSCHRG MED/CURRENT MED MERGE: CPT | Performed by: PHYSICIAN ASSISTANT

## 2022-03-04 PROCEDURE — 99212 OFFICE O/P EST SF 10 MIN: CPT

## 2022-03-04 PROCEDURE — 99204 OFFICE O/P NEW MOD 45 MIN: CPT | Performed by: PHYSICIAN ASSISTANT

## 2022-03-04 PROCEDURE — 4004F PT TOBACCO SCREEN RCVD TLK: CPT | Performed by: CLINICAL NURSE SPECIALIST

## 2022-03-04 RX ORDER — ASPIRIN 81 MG/1
81 TABLET, CHEWABLE ORAL 2 TIMES DAILY
Qty: 60 TABLET | Refills: 1 | Status: SHIPPED
Start: 2022-03-04 | End: 2022-06-03

## 2022-03-04 RX ORDER — GABAPENTIN 100 MG/1
100 CAPSULE ORAL 3 TIMES DAILY
Qty: 21 CAPSULE | Refills: 0 | Status: SHIPPED
Start: 2022-03-04 | End: 2022-03-07

## 2022-03-04 NOTE — TELEPHONE ENCOUNTER
I called the patient 2 times after her office visit today and left a message. There was no answer either time. At her visit today, we discussed that she will likely need surgery on the right ankle. After reviewing her CT with Dr. Alex Colindres, we do recommend surgical fixation of her right ankle. She is on the surgery schedule for Thursday. In addition, she has an L2 compression fracture. She is scheduled to see neurosurgery on 3/14. We are going to contact neurosurgery regarding possibly moving her appointment up so she can be cleared by them to proceed with surgery on her ankle.

## 2022-03-04 NOTE — PROGRESS NOTES
Orthopaedic H&P Note    Johnathon Manzano is a 27 y.o. female, her YOB: 1995 [facility] the [facility]      Patient Active Problem List    Diagnosis Date Noted    Methadone maintenance therapy patient ([facility] 02/12/2022    Closed displaced fracture of medial malleolus of right tibia 02/12/2022    Cocaine abuse ([facility] [address] 02/12/2022    Compression fracture of L2 lumbar vertebra, closed, initial encounter ([facility] 02/11/2022    Contusion of right hip      Current Outpatient Medications   Medication Sig Dispense Refill    METHADONE HCL PO Take 100 mg by mouth daily       gabapentin (NEURONTIN) 100 MG capsule Take 1 capsule by mouth every 8 hours for 7 days. 21 capsule 0     No current facility-administered medications for this visit. Allergies: Patient has no known allergies. Past Medical History:   Diagnosis Date    Asthma     Drug abuse [facility])      Past Surgical History:   Procedure Laterality Date    WISDOM TOOTH EXTRACTION       No family history on file. Social History     Tobacco Use    Smoking status: Current Every Day Smoker     Packs/day: 1.00     Types: Cigarettes    Smokeless tobacco: Never Used   Substance Use Topics    Alcohol use: Yes     Comment: Social                             Chief Complaint   Patient presents with    Follow-up     R ankle medial mal fx from 2/11/22. Pain 9/10, c/o intermittent numbness and tingling to toes. Denies fever or chills. States compliant with NWB status    Other     XR in EPIC       SUBJECTIVE: Johnathon Manzano is a 13-year-old female with a past medical history of polysubstance use currently on methadone, nicotine dependence who presents as an ED follow-up for right ankle fracture. DOI: 2/11/2022. She states that she was cleaning her windows on the second story of her house from the inside when she lost her balance and fell through the window landing outside on her back.   She noticed immediate onset of back pain and right ankle pain. She denies any history of right foot or ankle problems. X-rays in the ED demonstrated a mildly displaced medial and anterior malleolus fracture. The ankle was reduced in the ED with postreduction films showing improved alignment of the fracture. She was placed into a splint and sent here for follow-up. She also sustained an L2 compression fracture for which she is scheduled to follow-up with neurosurgery. She denies numbness, tingling or paresthesias. No other orthopedic complaints at this time. Review of Systems   Constitutional: Negative for fever, chills, diaphoresis, appetite change and fatigue. HENT: Negative for dental issues, hearing loss and tinnitus. Negative for congestion, sinus pressure, sneezing, sore throat. Negative for headache. Eyes: Negative for visual disturbance, blurred and double vision. Negative for pain, discharge, redness and itching  Respiratory: Negative for cough, shortness of breath and wheezing. Cardiovascular: Negative for chest pain, palpitations and leg swelling. No dyspnea on exertion   Gastrointestinal:   Negative for nausea, vomiting, abdominal pain, diarrhea, constipation  or black or bloody. Hematologic\Lymphatic:  negative for bleeding, petechiae,   Genitourinary: Negative for hematuria and difficulty urinating. Musculoskeletal: Negative for neck pain and stiffness. Mild for back pain, negative joint swelling and gait problem. Skin: Negative for pallor, rash and wound. Neurological: Negative for dizziness, tremors, seizures, weakness, light-headedness, no TIA or stroke symptoms. No numbness and headaches. Psychiatric/Behavioral: Negative.      Physical Examination:   General appearance: alert, well appearing, and in no distress,  normal appearing weight  Mental status: alert, oriented to person, place, and time, normal mood, behavior, speech, dress, motor activity, and thought processes  Abdomen: soft, nondistended   Resp:   resp easy and unlabored, no audible wheezes note  Cardiac: distal pulses palpable, skin well perfused  Neurological: alert, oriented X3, normal speech, no focal findings or movement disorder noted, motor and sensory grossly normal bilaterally, normal muscle tone, no tremors, strength 5/5, normal gait and station  HEENT: normochephalic atraumatic, external ears and eyes normal, sclera normal, neck supple  Extremities:   peripheral pulses normal, no edema, redness or tenderness in the calves   Skin: normal coloration, no rashes or open wounds, no suspicious skin lesions noted  Psych: Affect euthymic   Musculoskeletal:    Extremity:  Right Lower Extremity  Skin clean dry and intact, without signs of infection  Mild edema and ecchymosis noted to the ankle and foot  Compartments supple throughout thigh and leg  Calf supple and nontender  Demonstrates active knee flexion/extension, wiggles toes. States sensation intact to touch in sural/deep peroneal/superficial peroneal/saphenous/posterior tibial nerve distributions to foot/ankle. Palpable dorsalis pedis and posterior tibialis pulses, cap refill brisk in toes, foot warm/perfused. Moderate tenderness over the medial malleolus. Temp 98.2 °F (36.8 °C)      XR: 3/4/22   3 views right ankle demonstrate right medial malleolus fracture with persistent linear lucency extending from the medial malleolus through the distal tibial metaphysis, concerning for proximal extension of the fracture. Widening of the medial ankle gutter noted. ASSESSMENT:   Diagnosis Orders   1. Closed displaced fracture of medial malleolus of right tibia, initial encounter  CT ANKLE RIGHT WO CONTRAST     Discussion:  Had lengthy discussion with patient regarding Her diagnosis, typical prognosis, and expected outcomes. I reviewed the possible complications from the injury itself despite treatment choosen.  I also discussed treatment options including nonoperative managements versus surgical management, along with risks and benefits of each. They have elected for operative management at this time. Discussed with patient factors that can impact patient's fracture healing. Patient has the following risk factors for union: Nicotine Use and Drug abuse    Risk, benefits and treatment options discussed with Angie Garcia. she has verbalized understanding of options. The possibility of complications were also discussed to include but not limited to nerve damage, infection, problems with wound healing, vascular injury, chronic pain, stiffness, dysfunction, nonhealing of the bone, symptomatic hardware and/or its failure, need for subsequent surgery, dislocation, and blood clots as well as medical related problems and other problems not specifically discussed. Risk of anesthesia also discussed to include death. Post-op care, work, activity and restrictions which included the use of pain medication and possibility of using blood thinner post op were also discussed with Ro Levine and she verbalized and agreed with the restrictions. PLAN:  Plan for OR on 3-10-22 with Dr. Eric Couch MD for ORIF bimalleolar right ankle fracture  Pre-surgery medical clearance is not needed  NPO after midnight the night before surgery  NWB on right lower extremity  Splint care instructed with return precautions  Hold NSAIDS 7 days prior to surgery  Hold aspirin the morning of your surgery  Pre-op COVID-19 testing  Patient seen and examined by Dr. Shital Shah  Right ankle CT ordered  She was placed back into a splint today. Discussed the importance of nicotine and drug cessation    Electronically signed by LUDMILA Menon on 3/4/2022 at 9:47 AM  Note: This report was completed using [employer] voiced recognition software. Every effort has been made to ensure accuracy; however, inadvertent computerized transcription errors may be present.

## 2022-03-04 NOTE — TELEPHONE ENCOUNTER
Spoke with Clarisse Corbin at [facility] PA-C office (Neurosurgery). Their office was able to move the appointment from 3- to 3-9-2022 @ 10:30 am with patient arriving to [facility] @ 9:30 am to have x-rays taken. I tried to contact the patient twice. LMAM advising patient of the appt change with Neurosurgery and to contact their office if she has further questions. Also stated Dr. Terry Voss wants surgery done to her Right Ankle on 3- @ 9 am with hospital arrival time of 7 am. Requested a call back to confirm she received voicemail.

## 2022-03-04 NOTE — PROGRESS NOTES
Trauma Clinic Progress Note   3/4/2022     Date of injury: 2/12         SHELLEY/Injuries:   Patient Active Problem List   Diagnosis Code    Contusion of right hip S70. 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 Authorizing Provider   gabapentin (NEURONTIN) 100 MG capsule Take 1 capsule by mouth every 8 hours for 7 days. 2/14/22 2/21/22  CLAUDIA Selby NP   METHADONE HCL PO Take 100 mg by mouth daily     Historical Provider, MD          CC:  Trauma follow up    Patient presents to the clinic today for follow up of a mechanical fall through a window. She presents to the ortho clinic via walker with her back brace in place. She states she is having pain in her RLE and her back. She states she is having difficulty sleeping because of the pain and discomfort. She states her appetite is good. She denies headache, dizziness, changes in vision or memory. She is not aware that she needs to follow up with neurosurgery for her back injury. Patient has seen orthopedics in follow up. All progress notes have been reviewed. Physical Exam   Physical Exam  Constitutional:       Appearance: Normal appearance. HENT:      Head: Normocephalic. Mouth/Throat:      Mouth: Mucous membranes are moist.   Eyes:      Pupils: Pupils are equal, round, and reactive to light.    Cardiovascular:      Rate and Rhythm: Normal rate and regular rhythm. Pulses: Normal pulses. Heart sounds: Normal heart sounds. Pulmonary:      Effort: Pulmonary effort is normal.      Breath sounds: Normal breath sounds. Abdominal:      Palpations: Abdomen is soft. Musculoskeletal:         General: Normal range of motion. Cervical back: Normal range of motion. Comments: TLSO in place   Skin:     General: Skin is warm and dry. Capillary Refill: Capillary refill takes less than 2 seconds. Neurological:      General: No focal deficit present. Mental Status: She is alert and oriented to person, place, and time. Psychiatric:         Mood and Affect: Mood normal.         Behavior: Behavior normal.         Thought Content: Thought content normal.         Judgment: Judgment normal.             Controlled substances monitoring: possible medication side effects, risk of tolerance and/or dependence, and alternative treatments discussed and OARRS report reviewed today- activity consistent with treatment plan. Education      Instructed to continue to use incentive spirometry 6-8 times per day. Instructed to increase activity. Instructed on concussion:  causes, definition, s&s, treatment, course of concussion. Instructed on opioid medications. Assessment  Patient Active Problem List   Diagnosis Code    Contusion of right hip S70. 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 Patient is on methadone    Total time spent face-to-face with the patient, reviewing records and documentation was 15 minutes.      CLAUDIA Aranda NP

## 2022-03-04 NOTE — PROGRESS NOTES
Call placed to [facility] to schedule STAT CT. Spoke to [facility] to have CT scheduled. Patient can go to registration at conclusion of appointment to be squeezed in at [place].

## 2022-03-04 NOTE — PROGRESS NOTES
CLINICAL PHARMACY NOTE: MEDS TO BEDS    Total # of Prescriptions Filled: 2   The following medications were delivered to the patient:  · Gabapentin 100 mg  · Aspirin 81 mg    Additional Documentation:  Patient picked up in pharmacy

## 2022-03-04 NOTE — PATIENT INSTRUCTIONS
1. Your surgery is scheduled for 3-10-22 at [address] [place] [address] with Dr. Ana Duran MD at the main [address] [place] [address] on [facility] in [facility] . You will need to report to Preop area  that morning at 7am    2. You are having Outpatient surgery so you will be returning home the same day  3. Preadmission Testing (PAT) department at [facility] will contact you with all the details prior to surgery. 4. Nothing to eat or drink after midnight the night before surgery. You may take a pain pill and any other medicine PAT instructs you to take with small sip of water if needed. 5. Keep splint clean and dry. Do not remove or get wet. 6. Continue with ice and elevation to reduce swelling  7. No weight bearing right lower extremity, use assistive devices  8. Take pain medicine as instructed  9. Call office with any question or concerns: [phone]. Hold ASA/LOVENOX the day of surgery. Hold all NSAIDs 7 days prior to surgery  11. Start taking aspirin 81 mg twice daily for DVT prophylaxis. All surgical patients will be gradually tapered off narcotic pain medication post operatively, this office will not continue narcotics longer than 6 weeks from date of surgery. If narcotics are required longer than this time period without objective finding you will be referred to pain management. Open Reduction With Internal Fixation for Limb Fracture: Before Your Surgery    What is open reduction with internal fixation? Open reduction with internal fixation is a type of surgery to fix a broken (fractured) bone. The doctor makes a cut, called an incision, in the skin over the bone. The doctor then moves the pieces of bone back into the normal position. This is called open reduction. The doctor may use special screws, pins, plates, or rods to hold the bone in place while it heals. This is called internal fixation. These devices may stay in your body from now on. The doctor closes the incision with stitches.  You will have a scar, but it will fade with time. You may spend from a few hours to a few days in the hospital. This depends on how serious your injury is. It usually takes 6 to 12 weeks for a broken bone to heal.    How soon you can go back to work and your normal routine depends on your job. It also depends on how long it takes your bone to heal. For example, if you have a broken leg and you sit at work, you may be able to go back in 1 to 2 weeks. But if your job requires you to walk or stand a lot, you may need to wait until your bone has healed.

## 2022-03-07 DIAGNOSIS — S32.000D COMPRESSION FRACTURE OF LUMBAR VERTEBRA WITH ROUTINE HEALING, UNSPECIFIED LUMBAR VERTEBRAL LEVEL, SUBSEQUENT ENCOUNTER: Primary | ICD-10-CM

## 2022-03-07 NOTE — TELEPHONE ENCOUNTER
Patient called to verify the date of her surgery for her Right ankle, She stated she received a call stating it was moved to Wednesday 03/09? ? Please contact patient with all information.

## 2022-03-08 NOTE — TELEPHONE ENCOUNTER
I spoke with patient. She does not want surgery done at this time for her Right Ankle. Asking for surgery to be cancelled. She will keep her appointment tomorrow with Neurosurgery. Patient stated she wants the splint on her Right Ankle looked at tomorrow in our office if possible after her Neurosurgery appointment. Bottom of splint is wet and coming apart. She would like for splint to be looked at tomorrow as well as speak with Dr. Kamille Burris if possible, to discuss her CT Scan results in person. I did instruct patient to come into our office tomorrow after her other appointment is finished to have her splint looked at. Surgery for 3- is now cancelled. Sending message to provider regarding reviewing CT Scan results tomorrow.

## 2022-03-08 NOTE — TELEPHONE ENCOUNTER
Yes patient to come in for splint change since damaged and wet.   Will discuss with Dr. Diana Merino if he wants surgery to be rescheduled/when  Electronically signed by Abbey Mauro PA-C on 3/8/2022 at 11:19 AM

## 2022-03-08 NOTE — TELEPHONE ENCOUNTER
Surgery date did not change. Surgery is still scheduled for 3- at 9 am with hospital arrival time of 7 am.  Appointment with Neurosurgery changed from Monday 3- to new date of Wednesday 3-9-2022 @ 10:30 am with arrival time of 9:30 am for xray's (as stated in message below).

## 2022-03-09 ENCOUNTER — OFFICE VISIT (OUTPATIENT)
Dept: NEUROSURGERY | Age: 27
End: 2022-03-09
Payer: COMMERCIAL

## 2022-03-09 ENCOUNTER — OFFICE VISIT (OUTPATIENT)
Dept: ORTHOPEDIC SURGERY | Age: 27
End: 2022-03-09
Payer: COMMERCIAL

## 2022-03-09 ENCOUNTER — HOSPITAL ENCOUNTER (OUTPATIENT)
Age: 27
Discharge: HOME | End: 2022-03-09
Payer: COMMERCIAL

## 2022-03-09 ENCOUNTER — PREP FOR PROCEDURE (OUTPATIENT)
Dept: ORTHOPEDIC SURGERY | Age: 27
End: 2022-03-09

## 2022-03-09 ENCOUNTER — HOSPITAL ENCOUNTER (OUTPATIENT)
Dept: GENERAL RADIOLOGY | Age: 27
Discharge: HOME | End: 2022-03-11
Payer: COMMERCIAL

## 2022-03-09 DIAGNOSIS — S32.000D COMPRESSION FRACTURE OF LUMBAR VERTEBRA WITH ROUTINE HEALING, UNSPECIFIED LUMBAR VERTEBRAL LEVEL, SUBSEQUENT ENCOUNTER: ICD-10-CM

## 2022-03-09 DIAGNOSIS — S82.871A CLOSED DISPLACED PILON FRACTURE OF RIGHT TIBIA, INITIAL ENCOUNTER: Primary | ICD-10-CM

## 2022-03-09 DIAGNOSIS — S32.020D CLOSED COMPRESSION FRACTURE OF L2 LUMBAR VERTEBRA WITH ROUTINE HEALING, SUBSEQUENT ENCOUNTER: Primary | ICD-10-CM

## 2022-03-09 PROCEDURE — G8484 FLU IMMUNIZE NO ADMIN: HCPCS | Performed by: ORTHOPAEDIC SURGERY

## 2022-03-09 PROCEDURE — 1111F DSCHRG MED/CURRENT MED MERGE: CPT | Performed by: PHYSICIAN ASSISTANT

## 2022-03-09 PROCEDURE — G8420 CALC BMI NORM PARAMETERS: HCPCS | Performed by: ORTHOPAEDIC SURGERY

## 2022-03-09 PROCEDURE — G8427 DOCREV CUR MEDS BY ELIG CLIN: HCPCS | Performed by: PHYSICIAN ASSISTANT

## 2022-03-09 PROCEDURE — 99215 OFFICE O/P EST HI 40 MIN: CPT | Performed by: ORTHOPAEDIC SURGERY

## 2022-03-09 PROCEDURE — 4004F PT TOBACCO SCREEN RCVD TLK: CPT | Performed by: PHYSICIAN ASSISTANT

## 2022-03-09 PROCEDURE — G8420 CALC BMI NORM PARAMETERS: HCPCS | Performed by: PHYSICIAN ASSISTANT

## 2022-03-09 PROCEDURE — 4004F PT TOBACCO SCREEN RCVD TLK: CPT | Performed by: ORTHOPAEDIC SURGERY

## 2022-03-09 PROCEDURE — 72100 X-RAY EXAM L-S SPINE 2/3 VWS: CPT

## 2022-03-09 PROCEDURE — 1111F DSCHRG MED/CURRENT MED MERGE: CPT | Performed by: ORTHOPAEDIC SURGERY

## 2022-03-09 PROCEDURE — 99213 OFFICE O/P EST LOW 20 MIN: CPT | Performed by: PHYSICIAN ASSISTANT

## 2022-03-09 PROCEDURE — G8427 DOCREV CUR MEDS BY ELIG CLIN: HCPCS | Performed by: ORTHOPAEDIC SURGERY

## 2022-03-09 PROCEDURE — 99213 OFFICE O/P EST LOW 20 MIN: CPT

## 2022-03-09 PROCEDURE — G8484 FLU IMMUNIZE NO ADMIN: HCPCS | Performed by: PHYSICIAN ASSISTANT

## 2022-03-09 RX ORDER — SODIUM CHLORIDE 9 MG/ML
25 INJECTION, SOLUTION INTRAVENOUS PRN
Status: CANCELLED | OUTPATIENT
Start: 2022-03-09

## 2022-03-09 RX ORDER — SODIUM CHLORIDE, SODIUM LACTATE, POTASSIUM CHLORIDE, CALCIUM CHLORIDE 600; 310; 30; 20 MG/100ML; MG/100ML; MG/100ML; MG/100ML
INJECTION, SOLUTION INTRAVENOUS CONTINUOUS
Status: CANCELLED | OUTPATIENT
Start: 2022-03-09

## 2022-03-09 RX ORDER — GABAPENTIN 100 MG/1
100 CAPSULE ORAL 3 TIMES DAILY
Qty: 90 CAPSULE | Refills: 0 | Status: SHIPPED | OUTPATIENT
Start: 2022-03-09 | End: 2022-04-08

## 2022-03-09 RX ORDER — SODIUM CHLORIDE 0.9 % (FLUSH) 0.9 %
10 SYRINGE (ML) INJECTION EVERY 12 HOURS SCHEDULED
Status: CANCELLED | OUTPATIENT
Start: 2022-03-09

## 2022-03-09 RX ORDER — SODIUM CHLORIDE 0.9 % (FLUSH) 0.9 %
10 SYRINGE (ML) INJECTION PRN
Status: CANCELLED | OUTPATIENT
Start: 2022-03-09

## 2022-03-09 NOTE — PROGRESS NOTES
Sylvia Shepard is a 27 y.o. female who presents for follow up of right ankle    SURGEON: Dr. Tabitha Sharma MD  Date of Injury/Surgery: 2/11/2022  Date last seen in office: 3/4/2022    Symptoms: better  New complaints: none    Cast/Splint, Brace, or Dressings: Taken down for visit and Disheveled    Weightbearing: right lower Non-weight bearing      Assistive device Walker - standard  Participating in therapy (location if yes)?  no    Refills Needed: None  Order/Referral Needed: N/A

## 2022-03-09 NOTE — PATIENT INSTRUCTIONS
1. Your surgery is scheduled for 3/10/22 at 10:00  with Dr. Dinah Rogers MD at the [address] on [facility] in [place] . You will need to report to Preop area  that morning at 8:00    2. You are having Outpatient surgery so you will be returning home the same day  3. Preadmission Testing (PAT) department at [facility] will contact you with all the details prior to surgery. 4. Nothing to eat or drink after midnight the night before surgery. You may take a pain pill and any other medicine PAT instructs you to take with small sip of water if needed. 5. Keep splint clean and dry. Do not remove or get wet. 6. Continue with ice and elevation to reduce swelling  7. No weight bearing right lower extremity, use assistive devices  8. Take pain medicine as instructed  9. Call office with any question or concerns: [phone]. Hold ASA/LOVENOX the day of surgery. Hold all NSAIDs 7 days prior to surgery    All surgical patients will be gradually tapered off narcotic pain medication post operatively, this office will not continue narcotics longer than 6 weeks from date of surgery. If narcotics are required longer than this time period without objective finding you will be referred to pain management. Open Reduction With Internal Fixation for Limb Fracture: Before Your Surgery    What is open reduction with internal fixation? Open reduction with internal fixation is a type of surgery to fix a broken (fractured) bone. The doctor makes a cut, called an incision, in the skin over the bone. The doctor then moves the pieces of bone back into the normal position. This is called open reduction. The doctor may use special screws, pins, plates, or rods to hold the bone in place while it heals. This is called internal fixation. These devices may stay in your body from now on. The doctor closes the incision with stitches. You will have a scar, but it will fade with time.     You may spend from a few hours to a few days in the hospital. This depends on how serious your injury is. It usually takes 6 to 12 weeks for a broken bone to heal.    How soon you can go back to work and your normal routine depends on your job. It also depends on how long it takes your bone to heal. For example, if you have a broken leg and you sit at work, you may be able to go back in 1 to 2 weeks. But if your job requires you to walk or stand a lot, you may need to wait until your bone has healed.

## 2022-03-09 NOTE — H&P
Chief Complaint   Patient presents with    Ankle Pain       Right ankle pain, splint damaged         SUBJECTIVE: Patient is a 25-year-old female whose nearly a month out from a right ankle fracture. We ordered a CT scan the fact that look like a pilon. On the CT scan is confirmed that it is a right tibial pilon fracture. The fibula is not involved. She is accompanied by family member today. She had initially canceled her surgery but after looking at the CT scan that showed her the intra-articular nature of the fracture. I discussed potential options of nonoperative treatment. I discussed the fact that she could get posttraumatic arthritis either way but her anatomy be in a better position of we fixed it. She also has been walking out of the bed I explained that she cannot do that even if we fix it. I went to the risk of surgery including death and anesthesia, infection, neurovascular damage, potential posttraumatic arthritis, potential for need for further operations in the future including ankle arthritic surgery, deep venous thrombosis, postoperative pain, and any other unforeseeable complications. She and her family member understood and decided to go forward with the surgery.     Past Medical History        Past Medical History:   Diagnosis Date    Asthma      Drug abuse ([facility]           Past Surgical History         Past Surgical History:   Procedure Laterality Date    WISDOM TOOTH EXTRACTION             Family History   No family history on file. Social History            Tobacco Use    Smoking status: Current Every Day Smoker       Packs/day: 1.00       Types: Cigarettes    Smokeless tobacco: Never Used   Vaping Use    Vaping Use: Former    Quit date: 2/4/2022    Substances: Nicotine    Devices: Disposable   Substance Use Topics    Alcohol use: Yes       Comment: Social    Drug use:  Yes       Types: Cocaine       Comment: States has stopped 2/9/22      No Known Allergies      Review of Systems -   General ROS: negative for - chills, fatigue, fever or night sweats  Respiratory ROS: no cough, shortness of breath, or wheezing  Cardiovascular ROS: no chest pain or dyspnea on exertion  Gastrointestinal ROS: no abdominal pain, change in bowel habits, or black or bloody stools  Genitourinary: no hematuria, dysuria, or incontinence   Musculoskeletal ROS:see above  Neurological ROS: no TIA or stroke symptoms        OBJECTIVE:   Alert and oriented X 3, no acute distress, respirations easy and unlabored with no audible wheezes, skin warm and dry, speech and dress appropriate for noted age, affect euthymic.     Extremity:   Right ankle  Splint clean dry intact  Wiggles toes  Capillary fill less than 3 seconds  Gross sensation tact distally  Palpable DP pulse  Calf soft nontender  Thigh soft and compressible     CT scan reviewed showing a tibial pilon fracture. The patient is a comminuted medial malleolus fracture with an anterior shear fragment appreciated in the sagittal view     LMP 02/23/2022      ASSESSMENT:       Diagnosis Orders   1.  Closed displaced pilon fracture of right tibia, initial encounter            PLAN:  See discussion above  Plan for open reduction internal fixation right tibial pilon fracture tomorrow  Ice and elevate for tonight n.p.o. after midnight call with any questions or concerns

## 2022-03-09 NOTE — PROGRESS NOTES
Chief Complaint   Patient presents with    Ankle Pain     Right ankle pain, splint damaged       SUBJECTIVE: Patient is a 78-year-old female whose nearly a month out from a right ankle fracture. We ordered a CT scan the fact that look like a pilon. On the CT scan is confirmed that it is a right tibial pilon fracture. The fibula is not involved. She is accompanied by family member today. She had initially canceled her surgery but after looking at the CT scan that showed her the intra-articular nature of the fracture. I discussed potential options of nonoperative treatment. I discussed the fact that she could get posttraumatic arthritis either way but her anatomy be in a better position of we fixed it. She also has been walking out of the bed I explained that she cannot do that even if we fix it. I went to the risk of surgery including death and anesthesia, infection, neurovascular damage, potential posttraumatic arthritis, potential for need for further operations in the future including ankle arthritic surgery, deep venous thrombosis, postoperative pain, and any other unforeseeable complications. She and her family member understood and decided to go forward with the surgery. Past Medical History:   Diagnosis Date    Asthma     Drug abuse [facility])      Past Surgical History:   Procedure Laterality Date    WISDOM TOOTH EXTRACTION       No family history on file.   Social History     Tobacco Use    Smoking status: Current Every Day Smoker     Packs/day: 1.00     Types: Cigarettes    Smokeless tobacco: Never Used   Vaping Use    Vaping Use: Former    Quit date: 2/4/2022    Substances: Nicotine    Devices: Disposable   Substance Use Topics    Alcohol use: Yes     Comment: Social    Drug use: Yes     Types: Cocaine     Comment: States has stopped 2/9/22     No Known Allergies        Review of Systems -   General ROS: negative for - chills, fatigue, fever or night sweats  Respiratory ROS: no cough, shortness of breath, or wheezing  Cardiovascular ROS: no chest pain or dyspnea on exertion  Gastrointestinal ROS: no abdominal pain, change in bowel habits, or black or bloody stools  Genitourinary: no hematuria, dysuria, or incontinence   Musculoskeletal ROS:see above  Neurological ROS: no TIA or stroke symptoms       OBJECTIVE:   Alert and oriented X 3, no acute distress, respirations easy and unlabored with no audible wheezes, skin warm and dry, speech and dress appropriate for noted age, affect euthymic. Extremity:    Right ankle  Splint clean dry intact  Wiggles toes  Capillary fill less than 3 seconds  Gross sensation tact distally  Palpable DP pulse  Calf soft nontender  Thigh soft and compressible    CT scan reviewed showing a tibial pilon fracture. The patient is a comminuted medial malleolus fracture with an anterior shear fragment appreciated in the sagittal view    LMP 02/23/2022     ASSESSMENT:     Diagnosis Orders   1.  Closed displaced pilon fracture of right tibia, initial encounter         PLAN:    See discussion above    Plan for open reduction internal fixation right tibial pilon fracture tomorrow    Ice and elevate for tonight n.p.o. after midnight call with any questions or concerns      ELECTRONICALLY signed by:    Anyi Banks MD  3/9/22

## 2022-03-09 NOTE — PROGRESS NOTES
Left message for Niharika Santos and the office voicemail unable to reach patient to inform she is scheduled for surgery tomorrow, 3/10, review information and provide instructions.

## 2022-03-09 NOTE — PROGRESS NOTES
Hospital Follow-up     Subjective: Windy Haider is a 27 y.o.  female who initially presented to the ED 2/11/22 after falling out of a window. She was found to have suffered a right malleolar fracture and and acute L2 compression fracture. Pt was placed in a TLSO brace. She presents to the office today with her mother for a 1 month follow up. Pt states her back pain is progressively improving. She does not currently have on her TLSO brace, but states she does wear it as instructed. She is following up with ortho for her right ankle. Gabapentin helps best with her pain, but she does not have any more refills. Denies new back pain, weakness, numbness, or tingling. Lumbar x-rays reviewed. Physical Exam:              WDWN, no apparent distress   Presents in a wheelchair              Non-labored breathing               Vitals Stable              Alert and oriented x3              CN 3-12 intact              PERRL              EOMI              Right ankle in splint, moving all other extremities well              Sensation to LT intact bilaterally                  Imaging: 3/9/22 lumbar x-rays: stable L2 compression fracture. No acute issues noted. Final report pending. Assessment: This is a 27 y.o.  female presenting for a 1 month follow up for L2 compression fracture. Stable. Plan:  -Pain control and expectations discussed  -Continue TLSO brace and restrictions   -Follow up with ortho  -Refill of gabapentin sent to pharmacy. -OARRS report reviewed   -Follow-up in neurosurgery clinic in 2 months for 3 month follow up with repeat lumbar x-rays  -Call or return to neurosurgery office sooner if symptoms worsen or if new issues arise in the interim.     Electronically signed by Melquiades Bull PA-C on 3/9/2022 at 10:25 AM
You can access the FollowMyHealth Patient Portal offered by [facility] by registering at the following website: [email or web address]. By joining [employer]’s FollowMyHealth portal, you will also be able to view your health information using other applications (apps) compatible with our system.

## 2022-03-10 ENCOUNTER — HOSPITAL ENCOUNTER (OUTPATIENT)
Age: 27
Setting detail: OUTPATIENT SURGERY
Discharge: HOME | End: 2022-03-10
Attending: ORTHOPAEDIC SURGERY | Admitting: ORTHOPAEDIC SURGERY
Payer: COMMERCIAL

## 2022-03-10 VITALS
HEIGHT: 61 IN | HEART RATE: 77 BPM | WEIGHT: 105 LBS | OXYGEN SATURATION: 98 % | SYSTOLIC BLOOD PRESSURE: 101 MMHG | RESPIRATION RATE: 20 BRPM | TEMPERATURE: 98.2 F | DIASTOLIC BLOOD PRESSURE: 71 MMHG | BODY MASS INDEX: 19.83 KG/M2

## 2022-03-10 LAB
AMPHETAMINE SCREEN, URINE: NOT DETECTED
BARBITURATE SCREEN URINE: NOT DETECTED
BENZODIAZEPINE SCREEN, URINE: NOT DETECTED
CANNABINOID SCREEN URINE: POSITIVE
COCAINE METABOLITE SCREEN URINE: POSITIVE
FENTANYL SCREEN, URINE: POSITIVE
HCG, URINE, POC: NEGATIVE
Lab: ABNORMAL
Lab: NORMAL
METHADONE SCREEN, URINE: POSITIVE
NEGATIVE QC PASS/FAIL: NORMAL
OPIATE SCREEN URINE: NOT DETECTED
OXYCODONE URINE: NOT DETECTED
PHENCYCLIDINE SCREEN URINE: NOT DETECTED
POSITIVE QC PASS/FAIL: NORMAL

## 2022-03-10 PROCEDURE — 80307 DRUG TEST PRSMV CHEM ANLYZR: CPT

## 2022-03-10 RX ORDER — SODIUM CHLORIDE 0.9 % (FLUSH) 0.9 %
10 SYRINGE (ML) INJECTION PRN
Status: DISCONTINUED | OUTPATIENT
Start: 2022-03-10 | End: 2022-03-10 | Stop reason: HOSPADM

## 2022-03-10 RX ORDER — SODIUM CHLORIDE, SODIUM LACTATE, POTASSIUM CHLORIDE, CALCIUM CHLORIDE 600; 310; 30; 20 MG/100ML; MG/100ML; MG/100ML; MG/100ML
INJECTION, SOLUTION INTRAVENOUS CONTINUOUS
Status: DISCONTINUED | OUTPATIENT
Start: 2022-03-10 | End: 2022-03-10 | Stop reason: HOSPADM

## 2022-03-10 RX ORDER — SODIUM CHLORIDE 9 MG/ML
25 INJECTION, SOLUTION INTRAVENOUS PRN
Status: DISCONTINUED | OUTPATIENT
Start: 2022-03-10 | End: 2022-03-10 | Stop reason: HOSPADM

## 2022-03-10 RX ORDER — SODIUM CHLORIDE 0.9 % (FLUSH) 0.9 %
10 SYRINGE (ML) INJECTION EVERY 12 HOURS SCHEDULED
Status: DISCONTINUED | OUTPATIENT
Start: 2022-03-10 | End: 2022-03-10 | Stop reason: HOSPADM

## 2022-03-10 ASSESSMENT — PAIN - FUNCTIONAL ASSESSMENT: PAIN_FUNCTIONAL_ASSESSMENT: 0-10

## 2022-03-10 NOTE — H&P
Chief Complaint   Patient presents with    Ankle Pain       Right ankle pain, splint damaged         SUBJECTIVE: Patient is a 25-year-old female whose nearly a month out from a right ankle fracture. We ordered a CT scan the fact that look like a pilon. On the CT scan is confirmed that it is a right tibial pilon fracture. The fibula is not involved. She is accompanied by family member today. She had initially canceled her surgery but after looking at the CT scan that showed her the intra-articular nature of the fracture. I discussed potential options of nonoperative treatment. I discussed the fact that she could get posttraumatic arthritis either way but her anatomy be in a better position of we fixed it. She also has been walking out of the bed I explained that she cannot do that even if we fix it. I went to the risk of surgery including death and anesthesia, infection, neurovascular damage, potential posttraumatic arthritis, potential for need for further operations in the future including ankle arthritic surgery, deep venous thrombosis, postoperative pain, and any other unforeseeable complications. She and her family member understood and decided to go forward with the surgery.     Past Medical History        Past Medical History:   Diagnosis Date    Asthma      Drug abuse ([facility] [address]           Past Surgical History         Past Surgical History:   Procedure Laterality Date    WISDOM TOOTH EXTRACTION             Family History   No family history on file. Social History            Tobacco Use    Smoking status: Current Every Day Smoker       Packs/day: 1.00       Types: Cigarettes    Smokeless tobacco: Never Used   Vaping Use    Vaping Use: Former    Quit date: 2/4/2022    Substances: Nicotine    Devices: Disposable   Substance Use Topics    Alcohol use: Yes       Comment: Social    Drug use:  Yes       Types: Cocaine       Comment: States has stopped 2/9/22      No Known Allergies           Review of Systems -   General ROS: negative for - chills, fatigue, fever or night sweats  Respiratory ROS: no cough, shortness of breath, or wheezing  Cardiovascular ROS: no chest pain or dyspnea on exertion  Gastrointestinal ROS: no abdominal pain, change in bowel habits, or black or bloody stools  Genitourinary: no hematuria, dysuria, or incontinence   Musculoskeletal ROS:see above  Neurological ROS: no TIA or stroke symptoms        OBJECTIVE:   Alert and oriented X 3, no acute distress, respirations easy and unlabored with no audible wheezes, skin warm and dry, speech and dress appropriate for noted age, affect euthymic.     Extremity:     Right ankle  Splint clean dry intact  Wiggles toes  Capillary fill less than 3 seconds  Gross sensation tact distally  Palpable DP pulse  Calf soft nontender  Thigh soft and compressible     CT scan reviewed showing a tibial pilon fracture. The patient is a comminuted medial malleolus fracture with an anterior shear fragment appreciated in the sagittal view     LMP 02/23/2022      ASSESSMENT:       Diagnosis Orders   1. Closed displaced pilon fracture of right tibia, initial encounter            PLAN:     See discussion above     Plan for open reduction internal fixation right tibial pilon fracture tomorrow     Ice and elevate for tonight n.p.o. after midnight call with any questions or concerns        ELECTRONICALLY signed by:  Yevgeniy Montero MD  3/9/22      Patient seen and examined. I saw her in the office yesterday with decision for surgery. Plan open reduction internal fixation right tibial pilon fracture.

## 2022-03-11 NOTE — PROGRESS NOTES
CLINICAL PHARMACY NOTE: MEDS TO BEDS    Total # of Prescriptions Filled: 1   The following medications were delivered to the patient:  · Gabapentin 100 mg  · Picked up in the pharmacy    Additional Documentation:

## 2022-06-03 ENCOUNTER — HOSPITAL ENCOUNTER (EMERGENCY)
Age: 27
Discharge: HOME | End: 2022-06-03
Payer: COMMERCIAL

## 2022-06-03 VITALS
OXYGEN SATURATION: 98 % | WEIGHT: 100 LBS | HEIGHT: 61 IN | HEART RATE: 81 BPM | RESPIRATION RATE: 14 BRPM | SYSTOLIC BLOOD PRESSURE: 121 MMHG | BODY MASS INDEX: 18.88 KG/M2 | DIASTOLIC BLOOD PRESSURE: 76 MMHG | TEMPERATURE: 98.3 F

## 2022-06-03 DIAGNOSIS — Z23 NEED FOR TDAP VACCINATION: ICD-10-CM

## 2022-06-03 DIAGNOSIS — S81.811A LACERATION OF RIGHT LOWER EXTREMITY, INITIAL ENCOUNTER: Primary | ICD-10-CM

## 2022-06-03 PROCEDURE — 6360000002 HC RX W HCPCS: Performed by: PHYSICIAN ASSISTANT

## 2022-06-03 PROCEDURE — 90471 IMMUNIZATION ADMIN: CPT | Performed by: PHYSICIAN ASSISTANT

## 2022-06-03 PROCEDURE — 6370000000 HC RX 637 (ALT 250 FOR IP): Performed by: NURSE PRACTITIONER

## 2022-06-03 PROCEDURE — 90714 TD VACC NO PRESV 7 YRS+ IM: CPT | Performed by: PHYSICIAN ASSISTANT

## 2022-06-03 PROCEDURE — 99284 EMERGENCY DEPT VISIT MOD MDM: CPT

## 2022-06-03 RX ORDER — IBUPROFEN 400 MG/1
600 TABLET ORAL ONCE
Status: COMPLETED | OUTPATIENT
Start: 2022-06-03 | End: 2022-06-03

## 2022-06-03 RX ORDER — IBUPROFEN 800 MG/1
400 TABLET ORAL EVERY 6 HOURS PRN
Qty: 20 TABLET | Refills: 0 | Status: SHIPPED | OUTPATIENT
Start: 2022-06-03 | End: 2022-06-07 | Stop reason: ALTCHOICE

## 2022-06-03 RX ORDER — DIAPER,BRIEF,INFANT-TODD,DISP
EACH MISCELLANEOUS ONCE
Status: DISCONTINUED | OUTPATIENT
Start: 2022-06-03 | End: 2022-06-03 | Stop reason: HOSPADM

## 2022-06-03 RX ORDER — CEPHALEXIN 500 MG/1
500 CAPSULE ORAL ONCE
Status: COMPLETED | OUTPATIENT
Start: 2022-06-03 | End: 2022-06-03

## 2022-06-03 RX ORDER — TETANUS AND DIPHTHERIA TOXOIDS ADSORBED 2; 2 [LF]/.5ML; [LF]/.5ML
0.5 INJECTION INTRAMUSCULAR ONCE
Status: COMPLETED | OUTPATIENT
Start: 2022-06-03 | End: 2022-06-03

## 2022-06-03 RX ORDER — CEPHALEXIN 500 MG/1
500 CAPSULE ORAL 4 TIMES DAILY
Qty: 40 CAPSULE | Refills: 0 | Status: SHIPPED | OUTPATIENT
Start: 2022-06-03 | End: 2022-06-07 | Stop reason: ALTCHOICE

## 2022-06-03 RX ADMIN — IBUPROFEN 600 MG: 400 TABLET, FILM COATED ORAL at 21:08

## 2022-06-03 RX ADMIN — CEPHALEXIN 500 MG: 500 CAPSULE ORAL at 21:08

## 2022-06-03 RX ADMIN — TETANUS AND DIPHTHERIA TOXOIDS ADSORBED 0.5 ML: 2; 2 INJECTION INTRAMUSCULAR at 20:55

## 2022-06-03 ASSESSMENT — PAIN - FUNCTIONAL ASSESSMENT: PAIN_FUNCTIONAL_ASSESSMENT: 0-10

## 2022-06-03 ASSESSMENT — PAIN DESCRIPTION - DESCRIPTORS: DESCRIPTORS: THROBBING

## 2022-06-03 ASSESSMENT — PAIN DESCRIPTION - ORIENTATION: ORIENTATION: RIGHT

## 2022-06-03 ASSESSMENT — PAIN SCALES - GENERAL: PAINLEVEL_OUTOF10: 6

## 2022-06-03 ASSESSMENT — LIFESTYLE VARIABLES: HOW OFTEN DO YOU HAVE A DRINK CONTAINING ALCOHOL: NEVER

## 2022-06-03 ASSESSMENT — PAIN DESCRIPTION - LOCATION: LOCATION: KNEE

## 2022-06-03 ASSESSMENT — PAIN DESCRIPTION - ONSET: ONSET: ON-GOING

## 2022-06-03 ASSESSMENT — PAIN DESCRIPTION - FREQUENCY: FREQUENCY: INTERMITTENT

## 2022-06-04 NOTE — ED PROVIDER NOTES
[address]  Department of Emergency Medicine   ED  Encounter Note  Admit Date/RoomTime: 6/3/2022  7:36 PM  ED Room: [facility]    NAME: Javan Farr  : 1995  MRN: [id number]     Chief Complaint:  Wound Check (was cut by a piece of glass back of right upper knee 2 days ago. Area has some redness noted )    History of Present Illness       Javan Farr is a 27 y.o. old female presenting to the emergency department by private vehicle, for a scabbed to the back or her right leg, caused by piece of broken glass 2 days ago which was on the edge of a chair when she went to sit down. There is not a possibility of retained foreign body in the affected area. Bleeding is  controlled. She takes no blood thinning agents. There is pain at injury site concerned because there is mild redness to the site. She denies any fever, chills or knee swelling. Tetanus Status:  unknown. ROS   Pertinent positives and negatives are stated within HPI, all other systems reviewed and are negative. Past Medical History:  has a past medical history of Asthma and Drug abuse ([address] Surgical History:  has a past surgical history that includes [place] tooth extraction. Social History:  reports that she has been smoking cigarettes. She has been smoking about 1.00 pack per day. She has never used smokeless tobacco. She reports current alcohol use. She reports current drug use. Drug: Cocaine. Family History: family history is not on file. Allergies: Patient has no known allergies. Physical Exam  Physical Exam   Oxygen Saturation Interpretation: Normal.        ED Triage Vitals   BP Temp Temp src Heart Rate Resp SpO2 Height Weight   121/76 22 1926 -- 22 192 14 22 19222 1933 06/03/22 1933    98.3 °F (36.8 °C)  (!) 106  98 % 5' 1\" (1.549 m) 100 lb (45.4 kg)         Constitutional:  Alert, development consistent with age. Neck:  Normal ROM. Supple. Extremity(s):  Right: Posterior knee. Tenderness:  mild. Swelling: None. Calf:  No evidence of DVT seen on physical exam. Negative Juan's sign. No cords or calf tenderness. No significant calf/ankle edema. .            Deformity: No.               ROM: full range of motion. Skin: 5 cm scratch leroy on popliteal region of right knee with mild erythema see photo below. .       Neurovascular: Motor deficit: none. Sensory deficit: none. Pulse deficit: none. Capillary refill: normal.  Gait:  limp due to affected limb. Lymphatics: No lymphangitis or adenopathy noted. Neurological:  Oriented. Motor functions intact. **Informed Consent**    The patient has given verbal consent to have photos taken of lower leg and electronically inserted into their ED Provider Note as part of their permanent medical record for purposes of illustration, documentation, treatment management and/or medical review. All Images taken on 6/3/22 of patient name: Kali Knee were taken by a [address] [place] [address],[address] approved registered mobile device via [employer] mobile application and transmitted then stored on a secured [place] [employer] located [facility]. Right posterior knee 5 cm scabbed scratch/laceration with mild erythema to wound edges and no drainage. Lab / Imaging Results   (All laboratory and radiology results have been personally reviewed by myself)  Labs:  No results found for this visit on 06/03/22. Imaging: All Radiology results interpreted by Radiologist unless otherwise noted.   No orders to display     ED Course / Medical Decision Making     Medications   bacitracin zinc ointment (has no administration in time range)   diptheria-tetanus toxoids [facility]) 2-2 LF/0.5ML injection 0.5 mL (0.5 mLs IntraMUSCular Given 6/3/22 2055)   cephALEXin (KEFLEX) capsule 500 mg (500 mg Oral Given 6/3/22 2108)   ibuprofen (ADVIL;MOTRIN) tablet 600 mg (600 mg Oral Given 6/3/22 2108)        Re-examination:  6/3/22       Time: 2010 patient given menstrual pad and went to BR to place gown on.   2033 patient not in room and went outside to smoke. Consult(s):   None    Procedure(s):   There were no wounds requiring formal closure. MDM:   Imaging was not obtained based on low suspicion for fracture / bony abnormality, retained foreign body as per history/physical findings. Patient has a healing laceration to her posterior popliteal region of the right leg and wound was cleaned and bacitracin applied she was given Keflex for mild erythematous changes at the borders of her wound and has no pain with range of motion of the knee joint and no signs of septic joint. Patient reports that the entire piece of glass was intact and has no concern for retained foreign body. Tetanus was updated today. Ace wrap was applied. Patient advised on signs and symptoms of infection warranting immediate return to the ED for reevaluation at any time such as worsening pain, streaking up the leg, pain out of proportion, fever,, chills or drainage return to the ED immediately. She will be given a short course of Keflex and NSAIDs for symptomatic relief. Patient advised on follow-up for reevaluation. Plan of Care/Counseling:  CLAUDIA Millan CNP reviewed today's visit with the patient in addition to providing specific details for the plan of care and counseling regarding the diagnosis and prognosis. Questions are answered at this time and are agreeable with the plan. Assessment      1. Laceration of right lower extremity, initial encounter    2. Need for Tdap vaccination      Plan   Discharged home.   Patient condition is good    New Medications     Discharge Medication List as of 6/3/2022  9:03 PM      START taking these medications    Details   cephALEXin (KEFLEX) 500 MG capsule Take 1 capsule by mouth 4 times daily for 10 days, Disp-40 capsule, R-0Print      ibuprofen (ADVIL;MOTRIN) 800 MG tablet Take 0.5 tablets by mouth every 6 hours as needed for Pain, Disp-20 tablet, R-0Print           Electronically signed by CLAUDIA Fritz CNP   DD: 6/3/22  **This report was transcribed using voice recognition software. Every effort was made to ensure accuracy; however, inadvertent computerized transcription errors may be present.   END OF ED PROVIDER NOTE      CLAUDIA Fritz CNP  06/03/22 9853

## 2022-06-06 ENCOUNTER — HOSPITAL ENCOUNTER (EMERGENCY)
Age: 27
Discharge: HOME | End: 2022-06-06

## 2022-06-06 VITALS
RESPIRATION RATE: 16 BRPM | HEART RATE: 90 BPM | DIASTOLIC BLOOD PRESSURE: 80 MMHG | SYSTOLIC BLOOD PRESSURE: 118 MMHG | OXYGEN SATURATION: 99 % | TEMPERATURE: 99.4 F

## 2022-06-07 ENCOUNTER — APPOINTMENT (OUTPATIENT)
Dept: GENERAL RADIOLOGY | Age: 27
End: 2022-06-07
Payer: COMMERCIAL

## 2022-06-07 ENCOUNTER — HOSPITAL ENCOUNTER (EMERGENCY)
Age: 27
Discharge: HOME | End: 2022-06-07
Payer: COMMERCIAL

## 2022-06-07 VITALS
DIASTOLIC BLOOD PRESSURE: 75 MMHG | HEIGHT: 61 IN | OXYGEN SATURATION: 99 % | SYSTOLIC BLOOD PRESSURE: 101 MMHG | TEMPERATURE: 97.7 F | HEART RATE: 93 BPM | BODY MASS INDEX: 18.88 KG/M2 | RESPIRATION RATE: 18 BRPM | WEIGHT: 100 LBS
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
## 2022-06-07 DIAGNOSIS — S30.811A ABRASION OF ABDOMINAL WALL, INITIAL ENCOUNTER: ICD-10-CM

## 2022-06-07 DIAGNOSIS — S61.452A DOG BITE OF LEFT HAND, INITIAL ENCOUNTER: Primary | ICD-10-CM
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
## 2022-06-07 DIAGNOSIS — S71.152A DOG BITE OF LEFT THIGH, INITIAL ENCOUNTER: ICD-10-CM

## 2022-06-07 PROCEDURE — 99283 EMERGENCY DEPT VISIT LOW MDM: CPT

## 2022-06-07 PROCEDURE — 6370000000 HC RX 637 (ALT 250 FOR IP): Performed by: NURSE PRACTITIONER

## 2022-06-07 PROCEDURE — 73552 X-RAY EXAM OF FEMUR 2/>: CPT

## 2022-06-07 PROCEDURE — 73130 X-RAY EXAM OF HAND: CPT

## 2022-06-07 PROCEDURE — 73110 X-RAY EXAM OF WRIST: CPT

## 2022-06-07 RX ORDER — AMOXICILLIN AND CLAVULANATE POTASSIUM 875; 125 MG/1; MG/1
1 TABLET, FILM COATED ORAL EVERY 12 HOURS SCHEDULED
Status: DISCONTINUED | OUTPATIENT
Start: 2022-06-07 | End: 2022-06-07

## 2022-06-07 RX ORDER — DIAPER,BRIEF,INFANT-TODD,DISP
EACH MISCELLANEOUS ONCE
Status: COMPLETED | OUTPATIENT
Start: 2022-06-07 | End: 2022-06-07

## 2022-06-07 RX ORDER — AMOXICILLIN AND CLAVULANATE POTASSIUM 875; 125 MG/1; MG/1
1 TABLET, FILM COATED ORAL ONCE
Status: COMPLETED | OUTPATIENT
Start: 2022-06-07 | End: 2022-06-07

## 2022-06-07 RX ORDER — IBUPROFEN 800 MG/1
800 TABLET ORAL EVERY 8 HOURS PRN
Qty: 21 TABLET | Refills: 0 | Status: SHIPPED | OUTPATIENT
Start: 2022-06-07 | End: 2022-06-14

## 2022-06-07 RX ORDER — AMOXICILLIN AND CLAVULANATE POTASSIUM 875; 125 MG/1; MG/1
1 TABLET, FILM COATED ORAL 2 TIMES DAILY
Qty: 14 TABLET | Refills: 0 | Status: SHIPPED | OUTPATIENT
Start: 2022-06-07 | End: 2022-06-14

## 2022-06-07 RX ORDER — OXYCODONE HYDROCHLORIDE AND ACETAMINOPHEN 5; 325 MG/1; MG/1
1 TABLET ORAL ONCE
Status: COMPLETED | OUTPATIENT
Start: 2022-06-07 | End: 2022-06-07

## 2022-06-07 RX ADMIN — OXYCODONE AND ACETAMINOPHEN 1 TABLET: 5; 325 TABLET ORAL at 03:32

## 2022-06-07 RX ADMIN — AMOXICILLIN AND CLAVULANATE POTASSIUM 1 TABLET: 875; 125 TABLET, FILM COATED ORAL at 03:37

## 2022-06-07 RX ADMIN — BACITRACIN ZINC: 500 OINTMENT TOPICAL at 03:32

## 2022-06-07 ASSESSMENT — PAIN - FUNCTIONAL ASSESSMENT: PAIN_FUNCTIONAL_ASSESSMENT: 0-10

## 2022-06-07 ASSESSMENT — PAIN SCALES - GENERAL
PAINLEVEL_OUTOF10: 5
PAINLEVEL_OUTOF10: 4

## 2022-06-07 ASSESSMENT — PAIN DESCRIPTION - LOCATION: LOCATION: HEAD

## 2022-06-07 NOTE — ED NOTES
At this time attempted to call patient back but she needed to go smoke      Spenser Cristobal RN  06/07/22 0132

## 2022-06-07 NOTE — ED PROVIDER NOTES
Department of Emergency Medicine  ED Provider Note  Admit Date: 6/7/2022  Room: [address]      HPI:  6/7/22, Time: 1:03 AM EDT  . Chief Complaint:   Animal Bite (2 hours ago)      Source of history provided by:  patient. History/Exam Limitations: none. Sven Brock is a 27 y.o. old female presenting to the emergency department for a dog bite to left wrist, left hand, left anterior thigh and abrasion to left lower abdomen, which occured 2 hour(s) prior to arrival.  Since onset the symptoms have been persistent. Patient presents to the emergency department by EMS after being bit by one of her friends dog. Patient reports that the dog is a pit bull mix. States that she got bit and cannot explain exactly the details or why. She does report that it was a friend of hers. She states she is unclear if the dog is up-to-date on its immunizations. She does report being up-to-date on her tetanus immunization. She does have a puncture leroy to the palmar aspect of the left hand beneath the first metacarpal as well as left anterior/dorsal wrist and the left anterior femur as well as abrasion type injury to the left lower abdomen. She does have a start of bruising as well as soft tissue swelling. Patient moderately uncomfortable on arrival here. Patient reports that police was not notified. Patient did not take anything for pain prior to arrival.    Symptoms:    Pain:   yes. Redness:   no.     Pruritis:   no.     Rash:   no.     Swelling:   no.     Laceration:   no.     Abrasion:   yes. Pustule:   no.     Puncture:   yes. Other:   N/A. Tetanus Status:  up to date. Rabies Risk Assessment:    Dog:   yes. Cat:   no.     Rodent:   no.     Bat:   no.     Other:   N/A. If Animal Related, Then;                   Abnormal Behavior Witnessed:  No.                  Geographic Location Where Bitten:  N/A.                   Immunization Status of Animal: Unknown. Associated Signs & Symptoms:    Fever/Chills:   no.     Swelling:   no.     Drainage:   no.     Review of Systems:   Pertinent positives and negatives are stated within HPI, all other systems reviewed and are negative.            --------------------------------------------- PAST HISTORY ---------------------------------------------  Past Medical History:  has a past medical history of Asthma and Drug abuse ([facility] [address] Past Surgical History:  has a past surgical history that includes [place] tooth extraction. Social History:  reports that she has been smoking cigarettes. She has been smoking about 1.00 pack per day. She has never used smokeless tobacco. She reports current alcohol use. She reports current drug use. Drug: Cocaine. Family History: family history is not on file. The patients home medications have been reviewed. Allergies: Patient has no known allergies. -------------------------------------------------- RESULTS -------------------------------------------------  All laboratory and radiology results have been personally reviewed by myself   LABS:  No results found for this visit on 06/07/22. RADIOLOGY:  Interpreted by Radiologist.  XR HAND LEFT (MIN 3 VIEWS)   Final Result      No acute findings. XR WRIST LEFT (MIN 3 VIEWS)   Final Result   No acute osseous findings. XR FEMUR LEFT (MIN 2 VIEWS)   Final Result   No acute findings. ------------------------- NURSING NOTES AND VITALS REVIEWED ---------------------------   The nursing notes within the ED encounter and vital signs as below have been reviewed.    /75   Pulse 93   Temp 97.7 °F (36.5 °C)   Resp 18   Ht 5' 1\" (1.549 m)   Wt 100 lb (45.4 kg)   LMP 05/23/2022   SpO2 99%   BMI 18.89 kg/m²   Oxygen Saturation Interpretation: Normal      ---------------------------------------------------PHYSICAL EXAM--------------------------------------      Constitutional/General: Alert and oriented x3, well appearing, non toxic in NAD  Head: Normocephalic and atraumatic  Eyes: PERRL, EOMI  Mouth: Oropharynx clear, handling secretions, no trismus  Neck: Supple, full ROM,   Pulmonary: Lungs clear to auscultation bilaterally,  Not in respiratory distress  Cardiovascular:  Regular rate and rhythm, no murmurs, gallops, or rubs. 2+ distal pulses  Abdomen: Soft, non tender, non distended,   Extremities: Moves all extremities x 4. Warm and well perfused  Skin: warm and dry without rash. There is a bite wound along the left hand puncture wound, left anterior wrist, left anterior femur and abrasion to left lower abdomen please refer to picture. .  Neurologic: GCS 15,  Psych: Normal Affect                **Informed Consent**    The patient has given verbal consent to have photos taken of dog bites and electronically inserted into their ED Provider Note as part of their permanent medical record for purposes of illustration, documentation, treatment management and/or medical review. All Images taken on 6/7/22 of patient name: Edith Odor were taken by a [address] [place] [address],[address] approved registered mobile device via [employer] mobile application and transmitted then stored on a secured [employer] Site located within [facility]. ------------------------------ ED COURSE/MEDICAL DECISION MAKING----------------------  Medications   bacitracin zinc ointment ( Topical Given 6/7/22 0332)   oxyCODONE-acetaminophen (PERCOCET) 5-325 MG per tablet 1 tablet (1 tablet Oral Given 6/7/22 0332)   amoxicillin-clavulanate (AUGMENTIN) 875-125 MG per tablet 1 tablet (1 tablet Oral Given 6/7/22 5820)         Medical Decision Making:    Plan will be for imaging, will perform wound care and provide patient with antibiotic. Imaging is completely unremarkable. Patient was provided with 1 Percocet tablet here as well as Augmentin and excellent wound care provided by myself.   Patient was provided with strict return instructions as well as the importance of good follow-up care and the importance of daily wound care and signs and symptoms to be aware of. Patient expressed understanding. Patient will be discharged home with Augmentin as well as Motrin. Patient expressed understanding. Patient will be safely discharged home. Counseling: The emergency provider has spoken with the patient and discussed todays results, in addition to providing specific details for the plan of care and counseling regarding the diagnosis and prognosis. Questions are answered at this time and they are agreeable with the plan.      --------------------------------- IMPRESSION AND DISPOSITION ---------------------------------    IMPRESSION  1. Dog bite of left hand, initial encounter    2. Dog bite of left thigh, initial encounter    3.  Abrasion of abdominal wall, initial encounter        DISPOSITION  Disposition: Discharge to home  Patient condition is good         CLAUDIA Camacho CNP  06/07/22 5804

## 2022-06-08 NOTE — PROGRESS NOTES
CLINICAL PHARMACY NOTE: MEDS TO BEDS    Total # of Prescriptions Filled: 2   The following medications were delivered to the patient:  · IBUPROFEN 800 MG  · AUGMENTIN 875/125 MG     Additional Documentation:

## 2022-07-14 ENCOUNTER — HOSPITAL ENCOUNTER (EMERGENCY)
Age: 27
Discharge: HOME | End: 2022-07-14
Attending: EMERGENCY MEDICINE
Payer: COMMERCIAL

## 2022-07-14 VITALS
RESPIRATION RATE: 16 BRPM | OXYGEN SATURATION: 100 % | SYSTOLIC BLOOD PRESSURE: 128 MMHG | DIASTOLIC BLOOD PRESSURE: 90 MMHG | TEMPERATURE: 98.1 F | HEART RATE: 59 BPM | WEIGHT: 100 LBS | BODY MASS INDEX: 18.88 KG/M2 | HEIGHT: 61 IN

## 2022-07-14 DIAGNOSIS — F11.93 OPIATE WITHDRAWAL (HCC): Primary | ICD-10-CM

## 2022-07-14 DIAGNOSIS — R19.7 DIARRHEA, UNSPECIFIED TYPE: ICD-10-CM

## 2022-07-14 DIAGNOSIS — R11.2 NON-INTRACTABLE VOMITING WITH NAUSEA, UNSPECIFIED VOMITING TYPE: ICD-10-CM

## 2022-07-14 DIAGNOSIS — E86.0 MILD DEHYDRATION: ICD-10-CM

## 2022-07-14 LAB
ACETAMINOPHEN LEVEL: <5 MCG/ML (ref 10–30)
ALBUMIN SERPL-MCNC: 4.7 G/DL (ref 3.5–5.2)
ALP BLD-CCNC: 66 U/L (ref 35–104)
ALT SERPL-CCNC: 14 U/L (ref 0–32)
AMORPHOUS: PRESENT
AMPHETAMINE SCREEN, URINE: NOT DETECTED
ANION GAP SERPL CALCULATED.3IONS-SCNC: 14 MMOL/L (ref 7–16)
AST SERPL-CCNC: 12 U/L (ref 0–31)
BACTERIA: ABNORMAL /HPF
BARBITURATE SCREEN URINE: NOT DETECTED
BASOPHILS ABSOLUTE: 0.03 E9/L (ref 0–0.2)
BASOPHILS RELATIVE PERCENT: 0.3 % (ref 0–2)
BENZODIAZEPINE SCREEN, URINE: NOT DETECTED
BILIRUB SERPL-MCNC: 0.4 MG/DL (ref 0–1.2)
BILIRUBIN URINE: NEGATIVE
BLOOD, URINE: ABNORMAL
BUN BLDV-MCNC: 17 MG/DL (ref 6–20)
CALCIUM SERPL-MCNC: 10.2 MG/DL (ref 8.6–10.2)
CANNABINOID SCREEN URINE: NOT DETECTED
CHLORIDE BLD-SCNC: 105 MMOL/L (ref 98–107)
CLARITY: ABNORMAL
CO2: 24 MMOL/L (ref 22–29)
COCAINE METABOLITE SCREEN URINE: POSITIVE
COLOR: YELLOW
CREAT SERPL-MCNC: 0.8 MG/DL (ref 0.5–1)
EKG ATRIAL RATE: 60 BPM
EKG P AXIS: 64 DEGREES
EKG P-R INTERVAL: 98 MS
EKG Q-T INTERVAL: 404 MS
EKG QRS DURATION: 82 MS
EKG QTC CALCULATION (BAZETT): 404 MS
EKG R AXIS: 80 DEGREES
EKG T AXIS: 66 DEGREES
EKG VENTRICULAR RATE: 60 BPM
EOSINOPHILS ABSOLUTE: 0 E9/L (ref 0.05–0.5)
EOSINOPHILS RELATIVE PERCENT: 0 % (ref 0–6)
ETHANOL: <10 MG/DL (ref 0–0.08)
FENTANYL SCREEN, URINE: POSITIVE
GFR AFRICAN AMERICAN: >60
GFR NON-AFRICAN AMERICAN: >60 ML/MIN/1.73
GLUCOSE BLD-MCNC: 108 MG/DL (ref 74–99)
GLUCOSE URINE: 250 MG/DL
HCT VFR BLD CALC: 44.9 % (ref 34–48)
HEMOGLOBIN: 15.1 G/DL (ref 11.5–15.5)
IMMATURE GRANULOCYTES #: 0.03 E9/L
IMMATURE GRANULOCYTES %: 0.3 % (ref 0–5)
KETONES, URINE: ABNORMAL MG/DL
LACTIC ACID: 1.8 MMOL/L (ref 0.5–2.2)
LEUKOCYTE ESTERASE, URINE: ABNORMAL
LIPASE: 52 U/L (ref 13–60)
LYMPHOCYTES ABSOLUTE: 1.18 E9/L (ref 1.5–4)
LYMPHOCYTES RELATIVE PERCENT: 10.2 % (ref 20–42)
Lab: ABNORMAL
MCH RBC QN AUTO: 29.7 PG (ref 26–35)
MCHC RBC AUTO-ENTMCNC: 33.6 % (ref 32–34.5)
MCV RBC AUTO: 88.2 FL (ref 80–99.9)
METHADONE SCREEN, URINE: POSITIVE
MONOCYTES ABSOLUTE: 0.59 E9/L (ref 0.1–0.95)
MONOCYTES RELATIVE PERCENT: 5.1 % (ref 2–12)
NEUTROPHILS ABSOLUTE: 9.74 E9/L (ref 1.8–7.3)
NEUTROPHILS RELATIVE PERCENT: 84.1 % (ref 43–80)
NITRITE, URINE: NEGATIVE
OPIATE SCREEN URINE: NOT DETECTED
OXYCODONE URINE: NOT DETECTED
PDW BLD-RTO: 11.9 FL (ref 11.5–15)
PH UA: 7 (ref 5–9)
PHENCYCLIDINE SCREEN URINE: NOT DETECTED
PLATELET # BLD: 319 E9/L (ref 130–450)
PMV BLD AUTO: 10.1 FL (ref 7–12)
POTASSIUM SERPL-SCNC: 4 MMOL/L (ref 3.5–5)
PROTEIN UA: ABNORMAL MG/DL
RBC # BLD: 5.09 E12/L (ref 3.5–5.5)
RBC UA: ABNORMAL /HPF (ref 0–2)
SALICYLATE, SERUM: <0.3 MG/DL (ref 0–30)
SODIUM BLD-SCNC: 143 MMOL/L (ref 132–146)
SPECIFIC GRAVITY UA: 1.02 (ref 1–1.03)
TOTAL PROTEIN: 7.9 G/DL (ref 6.4–8.3)
TRICYCLIC ANTIDEPRESSANTS SCREEN SERUM: NEGATIVE NG/ML
UROBILINOGEN, URINE: 1 E.U./DL
WBC # BLD: 11.6 E9/L (ref 4.5–11.5)
WBC UA: ABNORMAL /HPF (ref 0–5)

## 2022-07-14 PROCEDURE — 96361 HYDRATE IV INFUSION ADD-ON: CPT

## 2022-07-14 PROCEDURE — 80307 DRUG TEST PRSMV CHEM ANLYZR: CPT

## 2022-07-14 PROCEDURE — 82077 ASSAY SPEC XCP UR&BREATH IA: CPT

## 2022-07-14 PROCEDURE — 80053 COMPREHEN METABOLIC PANEL: CPT

## 2022-07-14 PROCEDURE — 2580000003 HC RX 258: Performed by: EMERGENCY MEDICINE

## 2022-07-14 PROCEDURE — 80143 DRUG ASSAY ACETAMINOPHEN: CPT

## 2022-07-14 PROCEDURE — 80179 DRUG ASSAY SALICYLATE: CPT

## 2022-07-14 PROCEDURE — 6360000002 HC RX W HCPCS: Performed by: EMERGENCY MEDICINE

## 2022-07-14 PROCEDURE — 85025 COMPLETE CBC W/AUTO DIFF WBC: CPT

## 2022-07-14 PROCEDURE — 81001 URINALYSIS AUTO W/SCOPE: CPT

## 2022-07-14 PROCEDURE — 6370000000 HC RX 637 (ALT 250 FOR IP): Performed by: EMERGENCY MEDICINE

## 2022-07-14 PROCEDURE — 83690 ASSAY OF LIPASE: CPT

## 2022-07-14 PROCEDURE — 96372 THER/PROPH/DIAG INJ SC/IM: CPT

## 2022-07-14 PROCEDURE — 83605 ASSAY OF LACTIC ACID: CPT

## 2022-07-14 PROCEDURE — 36415 COLL VENOUS BLD VENIPUNCTURE: CPT

## 2022-07-14 PROCEDURE — 93005 ELECTROCARDIOGRAM TRACING: CPT | Performed by: EMERGENCY MEDICINE

## 2022-07-14 PROCEDURE — 99284 EMERGENCY DEPT VISIT MOD MDM: CPT

## 2022-07-14 PROCEDURE — 96360 HYDRATION IV INFUSION INIT: CPT

## 2022-07-14 RX ORDER — METHADONE HYDROCHLORIDE 10 MG/ML
100 CONCENTRATE ORAL ONCE
Status: COMPLETED | OUTPATIENT
Start: 2022-07-14 | End: 2022-07-14

## 2022-07-14 RX ORDER — SODIUM CHLORIDE 9 MG/ML
INJECTION, SOLUTION INTRAVENOUS CONTINUOUS
Status: DISCONTINUED | OUTPATIENT
Start: 2022-07-14 | End: 2022-07-14 | Stop reason: HOSPADM

## 2022-07-14 RX ORDER — NICOTINE 21 MG/24HR
1 PATCH, TRANSDERMAL 24 HOURS TRANSDERMAL ONCE
Status: DISCONTINUED | OUTPATIENT
Start: 2022-07-14 | End: 2022-07-14 | Stop reason: HOSPADM

## 2022-07-14 RX ORDER — ONDANSETRON 4 MG/1
4 TABLET, ORALLY DISINTEGRATING ORAL EVERY 8 HOURS PRN
Qty: 10 TABLET | Refills: 0 | Status: SHIPPED | OUTPATIENT
Start: 2022-07-14 | End: 2022-07-14 | Stop reason: SDUPTHER

## 2022-07-14 RX ORDER — ONDANSETRON 4 MG/1
4 TABLET, ORALLY DISINTEGRATING ORAL EVERY 8 HOURS PRN
Qty: 10 TABLET | Refills: 0 | Status: SHIPPED | OUTPATIENT
Start: 2022-07-14

## 2022-07-14 RX ORDER — ONDANSETRON 4 MG/1
4 TABLET, ORALLY DISINTEGRATING ORAL ONCE
Status: COMPLETED | OUTPATIENT
Start: 2022-07-14 | End: 2022-07-14

## 2022-07-14 RX ORDER — KETOROLAC TROMETHAMINE 30 MG/ML
30 INJECTION, SOLUTION INTRAMUSCULAR; INTRAVENOUS ONCE
Status: COMPLETED | OUTPATIENT
Start: 2022-07-14 | End: 2022-07-14

## 2022-07-14 RX ADMIN — ONDANSETRON 4 MG: 4 TABLET, ORALLY DISINTEGRATING ORAL at 13:23

## 2022-07-14 RX ADMIN — SODIUM CHLORIDE: 9 INJECTION, SOLUTION INTRAVENOUS at 13:23

## 2022-07-14 RX ADMIN — KETOROLAC TROMETHAMINE 30 MG: 30 INJECTION, SOLUTION INTRAMUSCULAR; INTRAVENOUS at 13:24

## 2022-07-14 RX ADMIN — Medication 100 MG: at 13:29

## 2022-07-14 ASSESSMENT — PAIN DESCRIPTION - LOCATION: LOCATION: ABDOMEN

## 2022-07-14 ASSESSMENT — PAIN - FUNCTIONAL ASSESSMENT: PAIN_FUNCTIONAL_ASSESSMENT: NONE - DENIES PAIN

## 2022-07-14 ASSESSMENT — PATIENT HEALTH QUESTIONNAIRE - PHQ9: SUM OF ALL RESPONSES TO PHQ QUESTIONS 1-9: 10

## 2022-07-14 ASSESSMENT — PAIN SCALES - GENERAL: PAINLEVEL_OUTOF10: 10

## 2022-07-14 NOTE — ED PROVIDER NOTES
Department of Emergency Medicine   ED  Provider Note  Admit Date/RoomTime: 7/14/2022 11:15 AM  ED Room: /          History of Present Illness:  7/14/22, Time: 11:39 AM EDT  Chief Complaint   Patient presents with    Abdominal Pain     last used heroin, methadone, and other drugs 3 days ago, was in court today and sent here on own will, no official probate papers with person, per [facility] patient not offical probate                Eloy Ramírez is a 32 y.o. female presenting to the ED for nausea vomiting diarrhea abdominal pain, beginning few days. The complaint has been persistent, moderate in severity, and worsened by abstaining from drugs. Patient presents via EMS with nausea vomiting lower abdominal pain diarrhea. States she was previously on 100 mg of methadone, states she last had this 3 days ago, states she also used cocaine and heroin 3 days ago. States she is \"sick\", complains of multiple episodes of nonbloody nonbilious vomiting as well as nonbloody diarrhea. Was in court today and presented the emergency department. Per [facility] paperwork patient is not a police hold and not probated. Pharmacy was able to verify her methadone dosing at her methadone clinic as 100 mg    Review of Systems:   Pertinent positives and negatives are stated within HPI, all other systems reviewed and are negative.        --------------------------------------------- PAST HISTORY ---------------------------------------------  Past Medical History:  has a past medical history of Asthma and Drug abuse ([facility] [address] Past Surgical History:  has a past surgical history that includes [facility] tooth extraction. Social History:  reports that she has been smoking cigarettes. She has been smoking about 1.00 pack per day. She has never used smokeless tobacco. She reports current alcohol use. She reports current drug use. Drug: Cocaine. Family History: family history is not on file. . Unless otherwise noted, family history is non contributory    The patients home medications have been reviewed. Allergies: Patient has no known allergies. ---------------------------------------------------PHYSICAL EXAM--------------------------------------    Constitutional/General: Alert and oriented x3  Head: Normocephalic and atraumatic  Eyes: PERRL, EOMI, sclera non icteric  Mouth: Oropharynx clear, handling secretions, no trismus, no asymmetry of the posterior oropharynx or uvular edema  Neck: Supple, full ROM, no stridor, no meningeal signs  Respiratory: Lungs clear to auscultation bilaterally,Not in respiratory distress  Cardiovascular:  Regular rate. Regular rhythm. 2+ distal pulses. Equal extremity pulses. Chest: No chest wall tenderness  GI:  Abdomen Soft, nondistended, normal bowel sounds, diffusely tender without focality  Musculoskeletal: Moves all extremities x 4. Warm and well perfused, no clubbing, cyanosis, or edema. Capillary refill <3 seconds  Integument: skin warm and dry. No rashes. Neurologic: GCS 15, no focal deficits,    Psychiatric: Normal Affect      EKG: Interpreted by emergency department physician, Dr. Carol Rose   This EKG is signed and interpreted by me. Rate: 60  Rhythm: Sinus  Interpretation: Sinus rhythm, normal axis, [place] is 98, no evidence of preexcitation on EKG, QRS is 82, QTc is 404, nonspecific ST changes without prior for comparison  Comparison: no previous EKG available      -------------------------------------------------- RESULTS -------------------------------------------------  I have personally reviewed all laboratory and imaging results for this patient. Results are listed below.      LABS: (Lab results interpreted by me)  Results for orders placed or performed during the hospital encounter of 07/14/22   CBC with Auto Differential   Result Value Ref Range    WBC 11.6 (H) 4.5 - 11.5 E9/L    RBC 5.09 3.50 - 5.50 E12/L    Hemoglobin 15.1 11.5 - 15.5 g/dL    Hematocrit 44.9 34.0 - 48.0 %    MCV 88.2 80.0 - 99.9 fL    MCH 29.7 26.0 - 35.0 pg    MCHC 33.6 32.0 - 34.5 %    RDW 11.9 11.5 - 15.0 fL    Platelets 591 865 - 141 E9/L    MPV 10.1 7.0 - 12.0 fL    Neutrophils % 84.1 (H) 43.0 - 80.0 %    Immature Granulocytes % 0.3 0.0 - 5.0 %    Lymphocytes % 10.2 (L) 20.0 - 42.0 %    Monocytes % 5.1 2.0 - 12.0 %    Eosinophils % 0.0 0.0 - 6.0 %    Basophils % 0.3 0.0 - 2.0 %    Neutrophils Absolute 9.74 (H) 1.80 - 7.30 E9/L    Immature Granulocytes # 0.03 E9/L    Lymphocytes Absolute 1.18 (L) 1.50 - 4.00 E9/L    Monocytes Absolute 0.59 0.10 - 0.95 E9/L    Eosinophils Absolute 0.00 (L) 0.05 - 0.50 E9/L    Basophils Absolute 0.03 0.00 - 0.20 E9/L   Comprehensive Metabolic Panel   Result Value Ref Range    Sodium 143 132 - 146 mmol/L    Potassium 4.0 3.5 - 5.0 mmol/L    Chloride 105 98 - 107 mmol/L    CO2 24 22 - 29 mmol/L    Anion Gap 14 7 - 16 mmol/L    Glucose 108 (H) 74 - 99 mg/dL    BUN 17 6 - 20 mg/dL    CREATININE 0.8 0.5 - 1.0 mg/dL    GFR Non-African American >60 >=60 mL/min/1.73    GFR African American >60     Calcium 10.2 8.6 - 10.2 mg/dL    Total Protein 7.9 6.4 - 8.3 g/dL    Albumin 4.7 3.5 - 5.2 g/dL    Total Bilirubin 0.4 0.0 - 1.2 mg/dL    Alkaline Phosphatase 66 35 - 104 U/L    ALT 14 0 - 32 U/L    AST 12 0 - 31 U/L   Serum Drug Screen   Result Value Ref Range    Ethanol Lvl <10 mg/dL    Acetaminophen Level <5.0 (L) 10.0 - 83.7 mcg/mL    Salicylate, Serum <2.9 0.0 - 30.0 mg/dL    TCA Scrn NEGATIVE Cutoff:300 ng/mL   Urine Drug Screen   Result Value Ref Range    Amphetamine Screen, Urine NOT DETECTED Negative <1000 ng/mL    Barbiturate Screen, Ur NOT DETECTED Negative < 200 ng/mL    Benzodiazepine Screen, Urine NOT DETECTED Negative < 200 ng/mL    Cannabinoid Scrn, Ur NOT DETECTED Negative < 50ng/mL    Cocaine Metabolite Screen, Urine POSITIVE (A) Negative < 300 ng/mL    Opiate Scrn, Ur NOT DETECTED Negative < 300ng/mL    PCP Screen, Urine NOT DETECTED Negative < 25 ng/mL    Methadone Screen, Urine POSITIVE (A) Negative <300 ng/mL    Oxycodone Urine NOT DETECTED Negative <100 ng/mL    FENTANYL SCREEN, URINE POSITIVE (A) Negative <1 ng/mL    Drug Screen Comment: see below    Urinalysis   Result Value Ref Range    Color, UA Yellow Straw/Yellow    Clarity, UA CLOUDY (A) Clear    Glucose, Ur 250 (A) Negative mg/dL    Bilirubin Urine Negative Negative    Ketones, Urine TRACE (A) Negative mg/dL    Specific Gravity, UA 1.020 1.005 - 1.030    Blood, Urine TRACE-INTACT Negative    pH, UA 7.0 5.0 - 9.0    Protein, UA TRACE Negative mg/dL    Urobilinogen, Urine 1.0 <2.0 E.U./dL    Nitrite, Urine Negative Negative    Leukocyte Esterase, Urine TRACE (A) Negative   Lipase   Result Value Ref Range    Lipase 52 13 - 60 U/L   Lactic Acid   Result Value Ref Range    Lactic Acid 1.8 0.5 - 2.2 mmol/L   Microscopic Urinalysis   Result Value Ref Range    WBC, UA 1-3 0 - 5 /HPF    RBC, UA 1-3 0 - 2 /HPF    Bacteria, UA RARE (A) None Seen /HPF    Amorphous, UA PRESENT    EKG 12 Lead   Result Value Ref Range    Ventricular Rate 60 BPM    Atrial Rate 60 BPM    P-R Interval 98 ms    QRS Duration 82 ms    Q-T Interval 404 ms    QTc Calculation (Bazett) 404 ms    P Axis 64 degrees    R Axis 80 degrees    T Axis 66 degrees   ,       RADIOLOGY:  Interpreted by Radiologist unless otherwise specified  No orders to display                    ------------------------- NURSING NOTES AND VITALS REVIEWED ---------------------------   The nursing notes within the ED encounter and vital signs as below have been reviewed by myself  BP (!) 128/90   Pulse 59   Temp 98.1 °F (36.7 °C) (Oral)   Resp 16   Ht 5' 1\" (1.549 m)   Wt 100 lb (45.4 kg)   SpO2 100%   BMI 18.89 kg/m²     Oxygen Saturation Interpretation: Normal    The cardiac monitor revealed NSR with a heart rate in the 60s as interpreted by me. The cardiac monitor was ordered secondary to the patient's heart rate and to monitor the patient for dysrhythmia.    CPT [id number]    The patients available past medical records and past encounters were reviewed. ------------------------------ ED COURSE/MEDICAL DECISION MAKING----------------------  Medications   ondansetron (ZOFRAN-ODT) disintegrating tablet 4 mg (4 mg Oral Given 7/14/22 1323)   ketorolac (TORADOL) injection 30 mg (30 mg IntraMUSCular Given 7/14/22 1324)   methadone (DOLOPHINE) 10 MG/ML solution 100 mg (100 mg Oral Given 7/14/22 1329)                    Medical Decision Making:     I, Dr. Yasmine Anne am the primary provider of record    Presents with nausea vomiting generalized abdominal pain diarrhea after being off opiates for 3 days, pharmacy was able to confirm her methadone dose of 100 mg, she was treated supportively was given her methadone dose had significant provement her symptoms. Now tolerating p.o., denies suicidal homicidal ideation, please states she is not a placeholder probate. She is to follow-up with outpatient consultants      Re-Evaluations:          Re-evaluation. Patients symptoms are improving  Repeat physical examination is improved        This patient's ED course included: a personal history and physicial examination, re-evaluation prior to disposition and complex medical decision making and emergency management    This patient has been closely monitored during their ED course. Counseling: The emergency provider has spoken with the patient and discussed todays results, in addition to providing specific details for the plan of care and counseling regarding the diagnosis and prognosis. Questions are answered at this time and they are agreeable with the plan.       --------------------------------- IMPRESSION AND DISPOSITION ---------------------------------    IMPRESSION  1. Opiate withdrawal ([facility] [address]    2. Non-intractable vomiting with nausea, unspecified vomiting type    3. Diarrhea, unspecified type    4.  Mild dehydration        DISPOSITION  Disposition: Discharge to home  Patient condition is stable        NOTE: This report was transcribed using voice recognition software.  Every effort was made to ensure accuracy; however, inadvertent computerized transcription errors may be present       Faye Yusuf DO  07/14/22 [id number]

## 2023-07-07 NOTE — ED NOTES
Patient returned from [address] [place] [address], [address] [place] [address]  02/11/22 7710 Information: Selecting Yes will display possible errors in your note based on the variables you have selected. This validation is only offered as a suggestion for you. PLEASE NOTE THAT THE VALIDATION TEXT WILL BE REMOVED WHEN YOU FINALIZE YOUR NOTE. IF YOU WANT TO FAX A PRELIMINARY NOTE YOU WILL NEED TO TOGGLE THIS TO 'NO' IF YOU DO NOT WANT IT IN YOUR FAXED NOTE.

## (undated) DEVICE — GAMMEX® NON-LATEX PI ORTHO SIZE 8, STERILE POLYISOPRENE POWDER-FREE SURGICAL GLOVE: Brand: GAMMEX

## (undated) DEVICE — BANDAGE COMPR W4INXL10YD WHITE/BEIGE E MTRX HK LOOP CLSR

## (undated) DEVICE — GLOVE ORTHO 8   MSG9480

## (undated) DEVICE — SET ORTHO STD STORTSTD2

## (undated) DEVICE — TUBING SUCT 12FR MAL ALUM SHFT FN CAP VENT UNIV CONN W/ OBT

## (undated) DEVICE — DRILL SYSTEM 7

## (undated) DEVICE — GOWN,AURORA,BRTHSLV,2XL,18/CS: Brand: MEDLINE

## (undated) DEVICE — GLOVE ORANGE PI 8   MSG9080

## (undated) DEVICE — DRESSING,GAUZE,XEROFORM,CURAD,5"X9",ST: Brand: CURAD

## (undated) DEVICE — SET ORTHO STD STORTSTD1

## (undated) DEVICE — GLOVE SURG SZ 8 L12IN FNGR THK79MIL GRN LTX FREE

## (undated) DEVICE — LOWER EXT KNEE DRAPE: Brand: MEDLINE INDUSTRIES, INC.

## (undated) DEVICE — PADDING,UNDERCAST,COTTON, 4"X4YD STERILE: Brand: MEDLINE